# Patient Record
Sex: MALE | Race: BLACK OR AFRICAN AMERICAN
[De-identification: names, ages, dates, MRNs, and addresses within clinical notes are randomized per-mention and may not be internally consistent; named-entity substitution may affect disease eponyms.]

---

## 2020-01-22 ENCOUNTER — HOSPITAL ENCOUNTER (INPATIENT)
Dept: HOSPITAL 92 - ERS | Age: 55
LOS: 2 days | Discharge: HOME | DRG: 812 | End: 2020-01-24
Attending: FAMILY MEDICINE | Admitting: FAMILY MEDICINE
Payer: MEDICARE

## 2020-01-22 VITALS — BODY MASS INDEX: 37.5 KG/M2

## 2020-01-22 DIAGNOSIS — Z86.73: ICD-10-CM

## 2020-01-22 DIAGNOSIS — Z95.5: ICD-10-CM

## 2020-01-22 DIAGNOSIS — D50.9: Primary | ICD-10-CM

## 2020-01-22 DIAGNOSIS — I25.10: ICD-10-CM

## 2020-01-22 DIAGNOSIS — I10: ICD-10-CM

## 2020-01-22 DIAGNOSIS — E78.00: ICD-10-CM

## 2020-01-22 DIAGNOSIS — E78.5: ICD-10-CM

## 2020-01-22 DIAGNOSIS — G40.909: ICD-10-CM

## 2020-01-22 DIAGNOSIS — F32.9: ICD-10-CM

## 2020-01-22 DIAGNOSIS — K21.9: ICD-10-CM

## 2020-01-22 DIAGNOSIS — I25.2: ICD-10-CM

## 2020-01-22 LAB
ALBUMIN SERPL BCG-MCNC: 3.8 G/DL (ref 3.5–5)
ALP SERPL-CCNC: 94 U/L (ref 40–110)
ALT SERPL W P-5'-P-CCNC: (no result) U/L (ref 8–55)
ANION GAP SERPL CALC-SCNC: 12 MMOL/L (ref 10–20)
APTT PPP: 35.6 SEC (ref 22.9–36.1)
AST SERPL-CCNC: 14 U/L (ref 5–34)
BASOPHILS # BLD AUTO: 0.1 THOU/UL (ref 0–0.2)
BASOPHILS NFR BLD AUTO: 0.7 % (ref 0–1)
BILIRUB SERPL-MCNC: 0.3 MG/DL (ref 0.2–1.2)
BUN SERPL-MCNC: 12 MG/DL (ref 8.4–25.7)
CALCIUM SERPL-MCNC: 8.4 MG/DL (ref 7.8–10.44)
CHLORIDE SERPL-SCNC: 106 MMOL/L (ref 98–107)
CO2 SERPL-SCNC: 25 MMOL/L (ref 22–29)
CREAT CL PREDICTED SERPL C-G-VRATE: 0 ML/MIN (ref 70–130)
EOSINOPHIL # BLD AUTO: 0.2 THOU/UL (ref 0–0.7)
EOSINOPHIL NFR BLD AUTO: 2.4 % (ref 0–10)
GLOBULIN SER CALC-MCNC: 3.4 G/DL (ref 2.4–3.5)
GLUCOSE SERPL-MCNC: 94 MG/DL (ref 70–105)
HGB BLD-MCNC: 6.6 G/DL (ref 14–18)
INR PPP: 1.1
IRON SERPL-MCNC: 13 UG/DL (ref 65–175)
LYMPHOCYTES # BLD: 1.9 THOU/UL (ref 1.2–3.4)
LYMPHOCYTES NFR BLD AUTO: 18.8 % (ref 21–51)
MCH RBC QN AUTO: 16.6 PG (ref 27–31)
MCV RBC AUTO: 61.3 FL (ref 78–98)
MDIFF COMPLETE?: YES
MICROCYTES BLD QL SMEAR: (no result) (100X)
MONOCYTES # BLD AUTO: 1.1 THOU/UL (ref 0.11–0.59)
MONOCYTES NFR BLD AUTO: 10.5 % (ref 0–10)
NEUTROPHILS # BLD AUTO: 6.8 THOU/UL (ref 1.4–6.5)
NEUTROPHILS NFR BLD AUTO: 67.6 % (ref 42–75)
OVALOCYTES BLD QL SMEAR: (no result) (100X)
PLATELET # BLD AUTO: 352 THOU/UL (ref 130–400)
POLYCHROMASIA BLD QL SMEAR: (no result) (100X)
POTASSIUM SERPL-SCNC: 3.8 MMOL/L (ref 3.5–5.1)
PROT UR STRIP.AUTO-MCNC: 10 MG/DL
PROTHROMBIN TIME: 13.8 SEC (ref 12–14.7)
RBC # BLD AUTO: 4 MILL/UL (ref 4.7–6.1)
RBC UR QL AUTO: (no result) HPF (ref 0–3)
REFLEX FOR REVIEW??: YES
RETICS/RBC NFR: 2.3 % (ref 0.5–1.5)
SODIUM SERPL-SCNC: 139 MMOL/L (ref 136–145)
TROPONIN I SERPL DL<=0.01 NG/ML-MCNC: (no result) NG/ML (ref ?–0.03)
TROPONIN I SERPL DL<=0.01 NG/ML-MCNC: (no result) NG/ML (ref ?–0.03)
UIBC SERPL-MCNC: 433 MCG/DL (ref 261–462)
WBC # BLD AUTO: 10 THOU/UL (ref 4.8–10.8)
WBC UR QL AUTO: (no result) HPF (ref 0–3)

## 2020-01-22 PROCEDURE — 82274 ASSAY TEST FOR BLOOD FECAL: CPT

## 2020-01-22 PROCEDURE — P9016 RBC LEUKOCYTES REDUCED: HCPCS

## 2020-01-22 PROCEDURE — 83021 HEMOGLOBIN CHROMOTOGRAPHY: CPT

## 2020-01-22 PROCEDURE — 36415 COLL VENOUS BLD VENIPUNCTURE: CPT

## 2020-01-22 PROCEDURE — 82607 VITAMIN B-12: CPT

## 2020-01-22 PROCEDURE — 83036 HEMOGLOBIN GLYCOSYLATED A1C: CPT

## 2020-01-22 PROCEDURE — 85060 BLOOD SMEAR INTERPRETATION: CPT

## 2020-01-22 PROCEDURE — 83540 ASSAY OF IRON: CPT

## 2020-01-22 PROCEDURE — 81003 URINALYSIS AUTO W/O SCOPE: CPT

## 2020-01-22 PROCEDURE — 84484 ASSAY OF TROPONIN QUANT: CPT

## 2020-01-22 PROCEDURE — 83880 ASSAY OF NATRIURETIC PEPTIDE: CPT

## 2020-01-22 PROCEDURE — 30233N1 TRANSFUSION OF NONAUTOLOGOUS RED BLOOD CELLS INTO PERIPHERAL VEIN, PERCUTANEOUS APPROACH: ICD-10-PCS | Performed by: FAMILY MEDICINE

## 2020-01-22 PROCEDURE — 36416 COLLJ CAPILLARY BLOOD SPEC: CPT

## 2020-01-22 PROCEDURE — 83550 IRON BINDING TEST: CPT

## 2020-01-22 PROCEDURE — 82746 ASSAY OF FOLIC ACID SERUM: CPT

## 2020-01-22 PROCEDURE — 86900 BLOOD TYPING SEROLOGIC ABO: CPT

## 2020-01-22 PROCEDURE — 80048 BASIC METABOLIC PNL TOTAL CA: CPT

## 2020-01-22 PROCEDURE — 85046 RETICYTE/HGB CONCENTRATE: CPT

## 2020-01-22 PROCEDURE — 85730 THROMBOPLASTIN TIME PARTIAL: CPT

## 2020-01-22 PROCEDURE — 80053 COMPREHEN METABOLIC PANEL: CPT

## 2020-01-22 PROCEDURE — 83690 ASSAY OF LIPASE: CPT

## 2020-01-22 PROCEDURE — 86850 RBC ANTIBODY SCREEN: CPT

## 2020-01-22 PROCEDURE — 82728 ASSAY OF FERRITIN: CPT

## 2020-01-22 PROCEDURE — 71045 X-RAY EXAM CHEST 1 VIEW: CPT

## 2020-01-22 PROCEDURE — 84443 ASSAY THYROID STIM HORMONE: CPT

## 2020-01-22 PROCEDURE — 80061 LIPID PANEL: CPT

## 2020-01-22 PROCEDURE — 93005 ELECTROCARDIOGRAM TRACING: CPT

## 2020-01-22 PROCEDURE — 86901 BLOOD TYPING SEROLOGIC RH(D): CPT

## 2020-01-22 PROCEDURE — 36430 TRANSFUSION BLD/BLD COMPNT: CPT

## 2020-01-22 PROCEDURE — C9113 INJ PANTOPRAZOLE SODIUM, VIA: HCPCS

## 2020-01-22 PROCEDURE — 85610 PROTHROMBIN TIME: CPT

## 2020-01-22 PROCEDURE — 87389 HIV-1 AG W/HIV-1&-2 AB AG IA: CPT

## 2020-01-22 PROCEDURE — 81015 MICROSCOPIC EXAM OF URINE: CPT

## 2020-01-22 PROCEDURE — 85025 COMPLETE CBC W/AUTO DIFF WBC: CPT

## 2020-01-22 NOTE — CON
DATE OF CONSULTATION:  01/22/2020



REASON FOR CONSULTATION:  Symptomatic anemia.



CONSULTING PROVIDER:  Germania Graham MD



HISTORY OF PRESENT ILLNESS:  The patient is a 55-year-old male with past medical

history of hypertension, hyperlipidemia, cerebrovascular accident with cognitive

dysfunction, seizure disorder, coronary artery disease, and chronic anemia,

initially presenting with complaints of chest pain.  He stated that over the last 2

months, he had been having repeated episodes of substernal chest pain that would

occur primarily with increased exertion or ambulating greater than 300 to 400 yards.

 This was also associated with dyspnea on exertion, but both the dyspnea and chest

pain would resolve with rest.  However, given the increased frequency of this

occurring, he was prompted by his girlfriend to go to the ER for further evaluation.

 On initial evaluation in the ER, he was noted to have a significantly decreased H

and H and was ultimately admitted to the hospital for further evaluation.  Upon

questioning the patient further, he states that he has been taking aspirin 81 mg

daily, but denies any other use of NSAIDs.  Currently denies any nausea, vomiting,

fevers, chills, hematemesis, melena, hematochezia, dysphagia, odynophagia, or weight

loss. 



Of note, the patient was admitted to the hospital in June 2018 with complaints of

loss of consciousness at the [facility].  On evaluation of the

patient in the ER at that time, he was noted to have a significantly decreased H and

H.  He subsequently underwent both upper and lower endoscopy during that admission

with normal findings during the upper endoscopy and only findings of a single

descending colon polyp that was later read as a tubular adenoma. 



REVIEW OF SYSTEMS:  A 10-category review of systems was obtained with all responses

negative except for the pertinent positives as listed in HPI. 



PAST MEDICAL HISTORY:  As per HPI.



PAST SURGICAL HISTORY:  Cardiac catheterization.



FAMILY HISTORY:  Denies any GI malignancies.



SOCIAL HISTORY:  Denies any tobacco, alcohol, or illicit drug use.



OUTPATIENT MEDICATIONS:  Reviewed.



ALLERGIES:  NO KNOWN DRUG ALLERGIES.



PHYSICAL EXAMINATION:

VITAL SIGNS:  Temperature 98.2, pulse 72, blood pressure 125/65, respiratory rate

16, saturating 96% on room air. 

GENERAL: The patient was sitting at bedside, in no acute distress.  Alert and

oriented x4. 

HEENT:  Normocephalic, atraumatic. 

NECK:  Supple.  No JVD or scleral icterus noted. 

CARDIOVASCULAR:  Regular rate and rhythm with no discernible murmurs, gallops, or

rubs, albeit somewhat muffled heart sounds. 

RESPIRATORY:  Clear to auscultation bilaterally with no discernible wheezes or

rales. 

ABDOMEN:  Normoactive bowel sounds.  Soft.  Mild abdominal distention.  No pain.  No

tenderness to palpation. 

EXTREMITIES:  1+/2+ bilateral lower extremity edema extending up to the lower

portion of the knees.  No cyanosis or clubbing. 



LABORATORY DATA:  CBC with a white blood cell count of 10, hemoglobin 6.6,

hematocrit 24.5, platelets 352.  Chemistry with a sodium of 139, potassium 3.8,

chloride 106, CO2 of 25, BUN 12, creatinine 0.8, glucose 94, AST 14, ALT less than

7, alkaline phosphatase 94, total bilirubin 0.3.  Iron 13, ferritin less than 2,

TIBC 433, INR 1.1, and albumin 3.8. 



IMAGING DATA:  Chest x-ray was obtained on January 22, 2020, which showed no acute

intrathoracic abnormality. 



ASSESSMENT AND PLAN:  The patient is a 55-year-old  male with past

medical history of hypertension, hyperlipidemia, CVA with cognitive dysfunction,

seizure disorder, coronary artery disease, and chronic anemia, presenting with

symptomatic anemia. 



Symptomatic anemia/anemia of unknown origin. 

The patient is presenting with a 2-month history of increased dyspnea on exertion

and chest pain with exertion that would essentially resolve with rest.  He did not

exhibit any worsening of either the symptoms during this time period, which was

initially concerning for stable angina.  However, the patient has been evaluated for

cardiac workup during this hospitalization and while he was undergoing this workup

was noted to have a significantly decreased H and H.  On chart review, the patient

was admitted to the [facility] in June 2018 for similar reasons,

where he was noted to have significant iron deficiency anemia with a decreased H and

H with an MCV of 72 at that time.  He subsequently underwent both EGD and

colonoscopy with no etiology for his bleeding seen on either of those studies.  The

patient was discharged with instructions to take iron supplementation.  However, he

discontinued most of his medications around 2 to 3 months ago.  At this time, the

patient is again presenting with recurrence of his iron deficiency anemia and

symptomatic anemia, raising the question of a possible GI bleed.  Given his negative

endoscopies in the past, a GI bleeding source is less likely, although cannot be

ruled out at this time.  Differential could include esophagitis, gastritis, peptic

ulcer disease, arteriovenous malformation, Dieulafoy lesion, colitis and/or GI

malignancy (less likely). 



RECOMMENDATIONS:  

1. We would continue to trend his H and H and transfuse as necessary to maintain an

H and H of 7/21. 

2. Continue to monitor clinically for signs of active GI bleeding.

3. We would place the patient on PPI 40 mg b.i.d. in light of possible upper GI

bleed. 

4. We would place the patient on a clear liquid diet today with n.p.o. at midnight

in anticipation of endoscopy tomorrow. 

5. We will plan for both EGD and colonoscopy tomorrow for intraluminal evaluation of

possible bleeding source.  If no etiology of his bleeding source is seen during

these examinations, then I would recommend outpatient followup and a capsule

endoscopy at that time. 

6. We would pursue non GI sources of anemia given negative studies in the past.

7. Agree with transfusion and iron supplementation. 



We will continue to follow.  Please call with any questions.







Job ID:  [id number]

## 2020-01-22 NOTE — PDOC.FPRHP
Addendum entered and electronically signed by Germania Graham MD  01/22/20 16:00

: 





A/P:


-IV protonix BID, still consider GI bleed with severe Hb 


-Will consult gastroenterology for further assessment 





Original Note:








- History of Present Illness


Chief Complaint: chest pain


History of Present Illness: 





56 yo AAM with CAD s/p stent x1, HLD, HTN, hx of CVA presents to ER for a few 

weeks of chest pain. Midsternal, sharp, non radiating. No nausea or diaphoresis 

but associated with dypsnea on exertion. Better with rest, worse with exertion. 

Has not taken anything for it since ran out of meds a few days ago. He was 

given isosorbide mononitrate with resolution of pain. EKG was WNL with no signs 

of ischemia. Incidentally on labs patient had Hb of 6.6. Denies hematemesis, 

melena, hematochezia or any other signs of bleeding. States he had a 

colonoscopy last year which was reportedly "normal." No family history of colon 

cancer, coagulopathies. Patient hasn't seen a PCP for two years.





- Allergies/Adverse Reactions


 Allergies











Allergy/AdvReac Type Severity Reaction Status Date / Time


 


No Known Allergies Allergy   Verified 01/22/20 15:44














- Home Medications


 











 Medication  Instructions  Recorded  Confirmed  Type


 


Aspirin [Ecotrin Low Strength] 81 mg PO DAILY #0 tab 12/11/15 01/22/20 Rx


 


Carvedilol [Coreg] 25 mg PO BID #0 tab 12/11/15 01/22/20 Rx


 


levETIRAcetam [Keppra] 500 mg PO BID 01/09/17 01/22/20 History


 


Isosorbide Mononitrate [Isosorbide 60 mg PO DAILY 01/22/20 01/22/20 History





Mononitrate ER]    














- History


PMHx:Seizure disorder, Basilar artery infarct in 2015, CAD with MI s/p stent, 

HTN, DLD





PSHx: Cardiac cath in 2014





FHx:Mother and father with MIs


 


Social:denies TAD


 








- Review of Systems


General: denies: fever/chills, weight/appetite/sleep changes


Respiratory: reports: shortness of breath, exercise intolerance.  denies: cough

, congestion


Cardiovascular: reports: chest pain.  denies: palpitation, edema


Gastrointestinal: denies: nausea, vomiting, diarrhea, constipation, abdominal 

pain, GI bleeding


Genitourinary: denies: incontinence, dysuria


Skin: denies: rashes, lesions


Musculoskeletal: denies: pain, tenderness


Neurological: reports: weakness


Psychological: denies: anxiety, depression





- Vital signs


BP: 106/80, MAP: 88, Pulse: 69, Resp: 15, Pain: 3, O2 sat: 99 on (Room Air), 

Time: 1/22/2020 12:55.





- Physical Exam


Constitutional: NAD, awake, alert and oriented


HEENT: normocephalic and atraumatic, PERRLA, EOMI, conjunctiva clear, MMM, 

oropharynx clear


Neck: supple, no LAD


Heart: RRR, normal S1/S2, no murmurs/rubs/gallops


Lungs: CTAB, no respiratory distress, good air movement


Abdomen: soft, non-tender


Musculoskeletal: normal structure, normal tone


-Neurological: 





right upper and lower extremity 4/5


left 5/5


Heme/Lymphatic: no unusual bruising or bleeding, no purpura





FMR H&P: Results





- Labs


Result Diagrams: 


 01/22/20 11:50





 01/22/20 11:50


Lab results: 


 











WBC  10.0 thou/uL (4.8-10.8)   01/22/20  11:50    


 


Hgb  6.6 g/dL (14.0-18.0)  L  01/22/20  11:50    


 


Hct  24.5 % (42.0-52.0)  L  01/22/20  11:50    


 


MCV  61.3 fL (78.0-98.0)  L  01/22/20  11:50    


 


Plt Count  352 thou/uL (130-400)   01/22/20  11:50    


 


Neutrophils %  67.6 % (42.0-75.0)   01/22/20  11:50    


 


Sodium  139 mmol/L (136-145)   01/22/20  11:50    


 


Potassium  3.8 mmol/L (3.5-5.1)   01/22/20  11:50    


 


Chloride  106 mmol/L ()   01/22/20  11:50    


 


Carbon Dioxide  25 mmol/L (22-29)   01/22/20  11:50    


 


BUN  12 mg/dL (8.4-25.7)   01/22/20  11:50    


 


Creatinine  0.80 mg/dL (0.7-1.3)   01/22/20  11:50    


 


Glucose  94 mg/dL ()   01/22/20  11:50    


 


Calcium  8.4 mg/dL (7.8-10.44)   01/22/20  11:50    


 


Total Bilirubin  0.3 mg/dL (0.2-1.2)   01/22/20  11:50    


 


AST  14 U/L (5-34)   01/22/20  11:50    


 


ALT  Less than  7 U/L (8-55)  L  01/22/20  11:50    


 


Alkaline Phosphatase  94 U/L ()   01/22/20  11:50    


 


Serum Total Protein  7.2 g/dL (6.0-8.3)   01/22/20  11:50    


 


Albumin  3.8 g/dL (3.5-5.0)   01/22/20  11:50    


 


Lipase  25 U/L (8-78)   01/22/20  11:50    


 


Urine Ketones  Negative mg/dL (Negative)   01/22/20  13:10    


 


Urine Blood  Trace  (Negative)  A  01/22/20  13:10    


 


Urine Nitrite  Negative  (Negative)   01/22/20  13:10    


 


Ur Leukocyte Esterase  Negative Hernan/uL (Negative)   01/22/20  13:10    


 


Urine RBC  0-3 HPF (0-3)   01/22/20  13:10    


 


Urine WBC  0-3 HPF (0-3)   01/22/20  13:10    


 


Ur Squamous Epith Cells  0-3 HPF (0-3)   01/22/20  13:10    


 


Urine Bacteria  None Seen HPF (None Seen)   01/22/20  13:10    














- Radiology Interpretation


  ** Chest x-ray


Status: image reviewed by me, report reviewed by me


Additional comment: 





cardiac silhouette seems mildly enlarged from my read however official 

radiology read it is within normal limits


negative for acute cardiopulmonary processes otherwise





FMR H&P: A/P





- Problem List


(1) Symptomatic anemia


Current Visit: Yes   Status: Acute   Code(s): D64.9 - ANEMIA, UNSPECIFIED   





(2) Chest pain


Current Visit: No   Status: Acute   Code(s): R07.9 - CHEST PAIN, UNSPECIFIED   





(3) HTN (hypertension)


Current Visit: No   Status: Chronic   Code(s): I10 - ESSENTIAL (PRIMARY) 

HYPERTENSION   


Qualifiers: 


   Hypertension type: essential hypertension   Qualified Code(s): I10 - 

Essential (primary) hypertension   





(4) History of CVA (cerebrovascular accident)


Current Visit: No   Status: Chronic   Code(s): Z86.73 - PRSNL HX OF TIA (TIA), 

AND CEREB INFRC W/O RESID DEFICITS   





- Plan





56 yo non compliant patient with hx of CVA, CAD s/p 1 stent, HLD, HTN admitted 

for symptomatic anemia and chest pain, ACS rule out.








1. Typical chest pain 2/2 symptomatic anemia vs. ACS


-Hb 6.6, FOBT neg, rectal exam w/ no gross blood per ERMD. 


-Transfuse 1 PRBC with repeat H/H, will order further labs to work up anemia 

with iron studies, PBS


-June 2018 had EGD & colonoscopy @ [facility] with essentially normal 

results- colonoscopy showed polyp. Will have records faxed to review. In 

meantime monitor for clinical signs of bleeding, vitals stable.


-In regards to ACS- several risk factors, non compliant with medications.  

Troponin neg x1, EKG WNL, however HEART score=5, will complete ACS workup. 

Admit to tele. Trend trops. Hold antiplatelets due to anemia and concern for 

bleeding. Risk stratifying labs.





2. HTN


-Resume home coreg when no longer NPO


-IV antihypertensives PRN





3. DLD


-Recheck FLP


-Restart statin





4. Seizure disorder


-Check keppra level


-Resume





5. Hx of CAD s/p stent


-Start on medication regimen 





6. Hx of basilar artery thrombosis


-Patient previously on plavix


-Hold for now in light of anemia





7. Possible hx of HF?


-Reported this to ERMD


-Patient last echo 2017 and NMST showing borderline EF of 55%


-Clinically euvolemic


-Unsure if on lasix? Discharged in 2017 on it but patient has been off of it 

for quite some time


-Monitor volume status, will repeat echo





8. GERD


-famotidine








dvt ppx: SCDs


gi ppx: famotidine


dispo: >2 midnights














FMR H&P: Upper Level





- Plan


Date/Time: 01/22/20 1421








I, [], have evaluated this patient and agree with findings/plan as outlined by 

intern resident. Pertinent changes/additions are listed here.








Addendum - Attending





- Attending Attestation


Date/Time: 01/22/20 3503





I personally evaluated the patient and discussed the management with Dr. Graham


I agree with the History, Examination, Assessment and Plan documented above 

with any addition or exceptions noted below -56 yo AAM with history of CAD s/p 

stent x1, HLD, HTN, seizure disorder, and h/o CVA presents with chest pain for 

last few weeks. Midsternal, sharp, non radiating. No nausea or diaphoresis but 

associated with dypsnea on exertion. Better with rest, worse with exertion. Has 

not taken anything for it since ran out of meds a few days ago. PMH/PSH/Meds 

reviewed and agree with resident's documentation. Afebrile VSS Exam repeated by 

me and agree with resident's findings. Labs: WBC=10, H/H=6.6/24.5, Eyi=682, Na=

139, K=3.8, Ii=714, CO2=25, BUN/Cr=12/0.80, Gluc=94, Fe=13. A/P: 1) Chest pain 

- initial troponin negative nad EKG with no acute changes. Will trend enzymes. 

Most likely secondary to demand due to severe anemia. 2) Severe microcytic 

anemia - transfusing 1 unit currently; recheck H/H later tonight. Go contacted 

and plan for C-scope tomorrow. 3) Seizure d/o - continue keppra, 4) CAD- will 

plan for stress test

## 2020-01-22 NOTE — RAD
CHEST 1 VIEW:

 

Date:  01/22/2020

 

HISTORY:  

Chest pain. 

 

COMPARISON:  

Radiograph dated 07/21/17. 

 

FINDINGS:

Lungs are clear. No pneumothorax or effusion. Cardiac silhouette and mediastinal contours within norm
al limits. 

 

IMPRESSION: 

No acute intrathoracic abnormality. 

 

 

POS: TPC

## 2020-01-23 LAB
ANION GAP SERPL CALC-SCNC: 14 MMOL/L (ref 10–20)
BUN SERPL-MCNC: 8 MG/DL (ref 8.4–25.7)
CALCIUM SERPL-MCNC: 8.3 MG/DL (ref 7.8–10.44)
CHD RISK SERPL-RTO: 5.1 (ref ?–4.5)
CHLORIDE SERPL-SCNC: 107 MMOL/L (ref 98–107)
CHOLEST SERPL-MCNC: 148 MG/DL
CO2 SERPL-SCNC: 25 MMOL/L (ref 22–29)
CREAT CL PREDICTED SERPL C-G-VRATE: 150 ML/MIN (ref 70–130)
GLUCOSE SERPL-MCNC: 82 MG/DL (ref 70–105)
HDLC SERPL-MCNC: 29 MG/DL
HGB BLD-MCNC: 7.7 G/DL (ref 14–18)
HGB BLD-MCNC: 8 G/DL (ref 14–18)
HIV 1/2 INDEX: 0.14 S/CO (ref ?–1)
LDLC SERPL CALC-MCNC: 103 MG/DL
MCH RBC QN AUTO: 18.4 PG (ref 27–31)
MCV RBC AUTO: 65.2 FL (ref 78–98)
PLATELET # BLD AUTO: 355 THOU/UL (ref 130–400)
POTASSIUM SERPL-SCNC: 3.5 MMOL/L (ref 3.5–5.1)
RBC # BLD AUTO: 4.33 MILL/UL (ref 4.7–6.1)
SODIUM SERPL-SCNC: 142 MMOL/L (ref 136–145)
TRIGL SERPL-MCNC: 80 MG/DL (ref ?–150)
WBC # BLD AUTO: 13.3 THOU/UL (ref 4.8–10.8)

## 2020-01-23 NOTE — PDOC.FM
- Subjective


Subjective: 


Doing well this morning, no acute events overnight. S/p 2u pRBC, tolerated 

well. Denies any CP, SOB, n/v, bloody stools, dark tarry stools. No dizziness/

lightheadness.








- Objective


MAR Reviewed: Yes


Vital Signs & Weight: 


 Vital Signs (12 hours)











  Temp Pulse Pulse Resp BP BP BP


 


 01/23/20 08:02  98.4 F  76   16   120/56 L 


 


 01/23/20 04:15  98 F  81   18   146/75 H 


 


 01/23/20 02:35  97.4 F L   85  16  153/77 H  


 


 01/23/20 02:20  98.1 F  80  80  16  142/87 H  142/87 H 


 


 01/22/20 20:45  98.4 F  83   16    137/81














  Pulse Ox


 


 01/23/20 08:02  94 L


 


 01/23/20 04:15  98


 


 01/23/20 02:35  100


 


 01/23/20 02:20  95


 


 01/22/20 20:45  99








 Weight











Weight                         99.11 kg














I&O: 


 











 01/22/20 01/23/20 01/24/20





 06:59 06:59 06:59


 


Intake Total  1530 


 


Output Total  800 


 


Balance  730 











Result Diagrams: 


 01/23/20 05:52





 01/23/20 05:52


EKG Reviewed by me: Yes (Tele: NSR)





Phys Exam





- Physical Examination


Constitutional: NAD (resting comfortably, A/O x2, not to time, but appears at 

baseline)


HEENT: moist MMs


pale mucous membranes


Neck: supple


Respiratory: no wheezing, no rales, no rhonchi, clear to auscultation bilateral


Cardiovascular: RRR, no significant murmur, no rub


Gastrointestinal: soft, non-tender, no distention, positive bowel sounds


Musculoskeletal: no edema


Neurological: non-focal, moves all 4 limbs


Psychiatric: normal affect





Dx/Plan


(1) Symptomatic anemia


Code(s): D64.9 - ANEMIA, UNSPECIFIED   Status: Acute   





(2) Chest pain


Code(s): R07.9 - CHEST PAIN, UNSPECIFIED   Status: Resolved   





(3) HTN (hypertension)


Code(s): I10 - ESSENTIAL (PRIMARY) HYPERTENSION   Status: Chronic   


Qualifiers: 


   Hypertension type: essential hypertension   Qualified Code(s): I10 - 

Essential (primary) hypertension   





(4) History of CVA (cerebrovascular accident)


Code(s): Z86.73 - PRSNL HX OF TIA (TIA), AND CEREB INFRC W/O RESID DEFICITS   

Status: Chronic   





- Plan


Plan: 


54 yo AAM non-compliant patient with hx of CVA, CAD s/p 1 stent, HLD, HTN 

admitted for symptomatic anemia and chest pain, ACS rule out.





#Typical chest pain 2/2 symptomatic anemia vs. ACS


- Hb 6.6 at admission, FOBT neg, rectal exam w/ no gross blood per ER MD. 


- s/p 2u pRBC with improvement of Hb to 8 and resolution of sxs this AM


- Ferritin <2.


- Consulted GI, had EGD and colonoscopy in June 2018 for similar presentation 

that was negative except for 1 polyp, GI plan to perform repeat upper and lower 

scope today to eval for acute GI blood loss. 


- VSS this AM, no events on tele. Trops neg x3


- Holding antiplts


- Will check HIV





#HTN


-Resume home coreg when no longer NPO


-IV antihypertensives PRN





#HLD


- FLP WNL. Will cont home statin 2/2 h/o CAD.





#Seizure disorder


-Cont home Keppra.





#Hx of CAD s/p stent


- cont medication regimen 





#Hx of basilar artery thrombosis


- Patient previously on plavix. Hold for now in light of anemia





#Possible hx of HF?


-Reported this to ERMD. Patient last echo 2017 and NMST showing borderline EF 

of 55%


-Clinically euvolemic


-Unsure if on lasix? Discharged in 2017 on it but patient has been off of it 

for quite some time


-Monitor volume status, will repeat echo





#GERD


-famotidine





dvt ppx: SCDs


gi ppx: famotidine


dispo: >2 midnights





Dispo: Admitted for sxs anemia. GI consulted, plan for upper and lower GI today

, pending results and recs.








Addendum - Attending





- Attending Attestation


Date/Time: 01/23/20 7879





I personally evaluated the patient and discussed the management with Dr. Frank. 


I agree with the History, Examination, Assessment and Plan documented above 

with any addition or exceptions noted below.





Patient here for symptomatic anemia, unknown cause. However, his anemia is non-

regenerative and he has profound iron deficiency. He is undergoing GI 

evaluation today to evaluate for bleeding source, but he still has component of 

decreased production. Will give iron infusion and may need further workup on 

the reason for his hyporegenerative anemia. Symptoms improved after 

transfusion. Further mgmt per GI recs.

## 2020-01-23 NOTE — PRG
DATE OF SERVICE:  01/23/2020



REASON FOR CONSULTATION:  Symptomatic anemia.



SUBJECTIVE:  The patient states that he is feeling better today after the infusion

of blood yesterday and has not had any episodes of hematemesis, melena, or

hematochezia.  He was scheduled for both EGD and colonoscopy earlier this morning,

but inadvertently ate potato chips prior to being called for these procedures,

thereby cancelling his n.p.o. status and ability to undergo deep sedation with

anesthesia support.  Otherwise, the patient denies any nausea, vomiting, fevers,

chills, dysphagia, or odynophagia. 



OBJECTIVE:  VITAL SIGNS:  Temperature 98.2, pulse 80, blood pressure 144/80,

respiratory rate 18, and saturating 95% on room air. 

GENERAL:  The patient was lying in bed, in no acute distress.  Alert and oriented

x4. 

CARDIOVASCULAR:  Regular rate and rhythm. 

RESPIRATORY:  Clear to auscultation bilaterally. 

ABDOMEN:  Normoactive bowel sounds.  Soft, nontender, and nondistended. 

EXTREMITIES:  1+/2+ bilateral lower extremity edema extending up to the lower

portion of the knees.  No cyanosis or clubbing. 



LABORATORY DATA:  CBC with a white blood cell count of 13.3, hemoglobin 8.0,

hematocrit 28.2, platelets 355.  Chemistry with a sodium of 142, potassium 3.5,

chloride 107, CO2 of 25, BUN 8, creatinine 0.79, glucose 85. 



IMAGING DATA:  No current GI imaging is available for review.



ASSESSMENT AND PLAN:  The patient is a 55-year-old  male with past

medical history of hypertension, hyperlipidemia, cerebrovascular accident with

cognitive dysfunction, seizure disorder, coronary artery disease, and chronic anemia

presenting with symptomatic anemia of unknown origin. 



Symptomatic anemia/anemia of unknown origin:  The patient has initially presented

with a two-month history of increased dyspnea on exertion and chest pain that would

essentially resolve with rest.  Upon evaluation in the ER, he had a significantly

decreased H and H when compared to previous.  On chart review, the patient was

admitted to [facility] in June 2018 for similar reasons and

noted to have iron deficiency anemia with decreased H and H at that time.  He

subsequently underwent both EGD and colonoscopy with no etiology for his blood loss

based on those studies.  Currently, doing well with no complaints of obvious

gastrointestinal bleeding given his recurrence of iron deficiency anemia and

symptomatic component of this anemia, repeat upper and lower endoscopy is indicated.

 However, if a gastrointestinal bleeding source cannot be elicited during this

hospitalization, a non-gastrointestinal bleeding source should be considered.

Differential could include esophagitis, gastritis, peptic ulcer disease,

arteriovenous malformation, Dieulafoy lesion, colitis and/or gastrointestinal

malignancy. 



RECOMMENDATIONS:  

1. Would continue to trend his H and H and transfuse as necessary to maintain an H

and H of 7/21. 

2. Continue to monitor clinically for signs of active GI bleeding.

3. Continue the patient on PPI 40 mg b.i.d. in light of possible upper GI bleed.

4. Would restart the patient on clear liquid diet today with repeat GoLYTELY prep in

preparation for EGD and colonoscopy tomorrow. 

5. If no etiology of the bleeding source is seen during the EGD and colonoscopy,

then I would recommend an outpatient followup in a capsule endoscopy at that time. 

6. Agree with pursuing a non-GI source of anemia given his negative GI studies in

the past. 

We will continue to follow.  Please call with any questions.







Job ID:  [id number]

## 2020-01-24 VITALS — SYSTOLIC BLOOD PRESSURE: 147 MMHG | DIASTOLIC BLOOD PRESSURE: 75 MMHG

## 2020-01-24 VITALS — TEMPERATURE: 98 F

## 2020-01-24 LAB
BASOPHILS # BLD AUTO: 0.1 THOU/UL (ref 0–0.2)
BASOPHILS NFR BLD AUTO: 0.6 % (ref 0–1)
EOSINOPHIL # BLD AUTO: 0.4 THOU/UL (ref 0–0.7)
EOSINOPHIL NFR BLD AUTO: 2.6 % (ref 0–10)
HGB A2 MFR BLD COLUMN CHROM: 1.5 % (ref 1.8–3.2)
HGB BLD-MCNC: 8.1 G/DL (ref 14–18)
HGB C MFR BLD: 0 %
HGB F MFR BLD: 0 % (ref 0–2)
HGB S MFR BLD: 0 %
LYMPHOCYTES # BLD: 2.4 THOU/UL (ref 1.2–3.4)
LYMPHOCYTES NFR BLD AUTO: 17.7 % (ref 21–51)
MCH RBC QN AUTO: 18.8 PG (ref 27–31)
MCV RBC AUTO: 65.2 FL (ref 78–98)
MDIFF COMPLETE?: YES
MICROCYTES BLD QL SMEAR: (no result) (100X)
MONOCYTES # BLD AUTO: 1.3 THOU/UL (ref 0.11–0.59)
MONOCYTES NFR BLD AUTO: 9.6 % (ref 0–10)
NEUTROPHILS # BLD AUTO: 9.4 THOU/UL (ref 1.4–6.5)
NEUTROPHILS NFR BLD AUTO: 69.5 % (ref 42–75)
OVALOCYTES BLD QL SMEAR: (no result) (100X)
PLATELET # BLD AUTO: 368 THOU/UL (ref 130–400)
POLYCHROMASIA BLD QL SMEAR: (no result) (100X)
RBC # BLD AUTO: 4.34 MILL/UL (ref 4.7–6.1)
WBC # BLD AUTO: 13.5 THOU/UL (ref 4.8–10.8)

## 2020-01-24 PROCEDURE — 0DB78ZX EXCISION OF STOMACH, PYLORUS, VIA NATURAL OR ARTIFICIAL OPENING ENDOSCOPIC, DIAGNOSTIC: ICD-10-PCS | Performed by: INTERNAL MEDICINE

## 2020-01-24 PROCEDURE — 0DBK8ZZ EXCISION OF ASCENDING COLON, VIA NATURAL OR ARTIFICIAL OPENING ENDOSCOPIC: ICD-10-PCS | Performed by: INTERNAL MEDICINE

## 2020-01-24 PROCEDURE — 0DBN8ZZ EXCISION OF SIGMOID COLON, VIA NATURAL OR ARTIFICIAL OPENING ENDOSCOPIC: ICD-10-PCS | Performed by: INTERNAL MEDICINE

## 2020-01-24 NOTE — OP
DATE OF PROCEDURE:  01/24/2020



PROCEDURES PERFORMED:  Esophagogastroduodenoscopy with biopsy and colonoscopy with

snare polypectomy. 



PREOPERATIVE DIAGNOSIS:  Iron-deficiency anemia.



DESCRIPTION OF PROCEDURE:  Informed consent was obtained from the patient.  He was

sedated with total intravenous anesthesia.  The bite block was placed, and the

endoscope was advanced easily to the second portion of the duodenum and retroflexion

was performed in the stomach.  The esophagus was normal.  The GE junction was

normal.  The stomach had 4 white-based ulcers in the antrum measuring around 8 mm

were cratered.  There was no stigmata of recent bleeding.  Biopsies were obtained

from the ulcer edges in the antrum and stomach to rule out H pylori.  Retroflexed

views in the stomach were normal.  The pylorus and first and second portions of the

duodenum were normal.  The patient was turned around.  Rectal exam was performed and

was normal.  The colonoscope was advanced to the terminal ileum without difficulty.

The mucosa of the terminal ileum was normal.  The ileocecal valve and appendiceal

orifice were clearly identified.  The preparation quality was good.  An 8 mm

pedunculated polyp was removed from the distal ascending colon by snare cautery

polypectomy.  A 5 mm flat, pale polyp was removed from the distal sigmoid colon by

cold snare polypectomy.  There were moderate internal hemorrhoids noted on

retroflexed views in the rectum.  The remainder of the colonic mucosa was normal. 



IMPRESSION:  

1. Four ulcers in the gastric antrum, which were cratered and measured around 8 mm

with clean white based without stigmata of recent bleeding.  Biopsies were obtained

to rule out Helicobacter pylori and also from the ulcer edges. 

2. 8 mm pedunculated polyp removed from the ascending colon.

3. 5 mm pale polyp removed from the sigmoid colon.

4. Otherwise, normal esophagogastroduodenoscopy and colonoscopy.



RECOMMENDATIONS:  

1. Iron supplementation.

2. Proton pump inhibitor.

3. Follow up in GI Clinic with Dr. Ng to verify that his hemoglobin is improving

with the iron and proton pump inhibitor. 

4. I will sign off for now.  Please call if GI can be of assistance.







Job ID:  [id number]

## 2020-01-24 NOTE — PDOC.FM
- Subjective


Subjective: 


Doing well this morning, no acute events overnight. Was unable to have 

endoscopy yesterday as pt ate food from guest in room. Continued bowel prep 

with plan for scopes this AM. No return of any CP. No blood per rectum. No 

dizziness/lightheadedness/n/v/sob. Clear stools with prep.








- Objective


MAR Reviewed: Yes


Vital Signs & Weight: 


 Vital Signs (12 hours)











  Temp Pulse Resp BP Pulse Ox


 


 01/24/20 07:25  98.1 F  81  18  131/70  97


 


 01/24/20 04:28  98 F  85  18  142/67 H  96


 


 01/23/20 20:43  98.2 F  80  18  144/80 H  95








 Weight











Weight                         99.11 kg














I&O: 


 











 01/23/20 01/24/20 01/25/20





 06:59 06:59 06:59


 


Intake Total 1530 640 


 


Output Total 800 1050 


 


Balance 730 -410 











Result Diagrams: 


 01/24/20 06:59





 01/23/20 05:52





Phys Exam





- Physical Examination


Constitutional: NAD (resting comfortably)


HEENT: moist MMs


Neck: supple


Respiratory: no wheezing, no rales, no rhonchi, clear to auscultation bilateral


Cardiovascular: RRR, no significant murmur, no rub


Gastrointestinal: soft, non-tender, no distention, positive bowel sounds


Musculoskeletal: no edema, pulses present


Neurological: non-focal, moves all 4 limbs


Psychiatric: normal affect


Deviation from normal: A/O x2 but appears at baseline mentation





Dx/Plan


(1) Symptomatic anemia


Code(s): D64.9 - ANEMIA, UNSPECIFIED   Status: Acute   





(2) Chest pain


Code(s): R07.9 - CHEST PAIN, UNSPECIFIED   Status: Resolved   





(3) HTN (hypertension)


Code(s): I10 - ESSENTIAL (PRIMARY) HYPERTENSION   Status: Chronic   


Qualifiers: 


   Hypertension type: essential hypertension   Qualified Code(s): I10 - 

Essential (primary) hypertension   





(4) History of CVA (cerebrovascular accident)


Code(s): Z86.73 - PRSNL HX OF TIA (TIA), AND CEREB INFRC W/O RESID DEFICITS   

Status: Chronic   





- Plan


Plan: 


56 yo AAM non-compliant patient with hx of CVA, CAD s/p 1 stent, HLD, HTN 

admitted for symptomatic anemia and chest pain, ACS rule out.





#Typical chest pain suspected 2/2 symptomatic anemia, pain resolved


- Hb 6.6 at admission, FOBT neg, rectal exam w/ no gross blood per ER MD. 


- s/p 2u pRBC with improvement of Hb to 8 and resolution of sxs. Hb stable at 8 

today.


- Ferritin <2. s/p IV iron


- Consulted GI, had EGD and colonoscopy in June 2018 for similar presentation 

that was negative except for 1 polyp, GI plan to perform repeat upper and lower 

scope today to eval for acute GI blood loss. Unable to obtain scopes yesterday 2

/2 PO intake. Apprec assistance and GI recs.


- VSS this AM, no events on tele. Trops neg x3


- Holding antiplts


- HIV negative


- Retic 2.3, absolute retic 1.3 demonstrating hypoproliferation, consider 2/2 

to diet and low iron vs bone marrow suppression. Hb electrophoresis pending. 

Other cell lines WNL. Will need continued OP work up for chronic anemia if 

scopes negative for acute GI bleed. 





#HTN


-Resume home coreg when no longer NPO


-IV antihypertensives PRN





#HLD


- FLP WNL. Will cont home statin 2/2 h/o CAD.





#Seizure disorder


- Cont home Keppra.





#Hx of CAD s/p stent


- cont medication regimen 





#Hx of basilar artery thrombosis


- Patient previously on plavix. Not on current home meds. 





#Possible hx of HF?


-Reported this to ERMD. Patient last echo 2017 and NMST showing borderline EF 

of 55%


-Clinically euvolemic


-Unsure if on lasix? Discharged in 2017 on it but patient has been off of it 

for quite some time


-Monitor volume status, will need OP f/u.





#GERD


-famotidine





Code: Full





dvt ppx: SCDs


Diet: NPO for endoscopy


IVF: SL





PCP: None





Dispo: Admitted for sxs anemia. GI consulted, plan for upper and lower GI today

, pending results and recs.








Addendum - Attending





- Attending Attestation


Date/Time: 01/24/20 [id number]





I personally evaluated the patient and discussed the management with Dr. Frank. 


I agree with the History, Examination, Assessment and Plan documented above 

with any addition or exceptions noted below.





Patient here for symptomatic anemia in the setting of a microcytic, non-

regenerative anemia. His iron counts are extremely low and he is s/p iron 

infusion. He is undergoing GI evaluation today to evaluate for source of blood 

loss. However, this may all be related to bone marrow failure from his severe 

iron deficiency or some other cause. We have begun that evaluation, but will 

await further testing until the completion of GI workup. H/H stable today. 

Further mgmt per GI recs.

## 2021-08-23 ENCOUNTER — HOSPITAL ENCOUNTER (INPATIENT)
Dept: HOSPITAL 92 - CSHERS | Age: 56
LOS: 3 days | Discharge: HOME HEALTH | DRG: 281 | End: 2021-08-26
Attending: FAMILY MEDICINE | Admitting: INTERNAL MEDICINE
Payer: MEDICARE

## 2021-08-23 VITALS — BODY MASS INDEX: 35.4 KG/M2

## 2021-08-23 DIAGNOSIS — Z20.822: ICD-10-CM

## 2021-08-23 DIAGNOSIS — E78.5: ICD-10-CM

## 2021-08-23 DIAGNOSIS — I21.A1: ICD-10-CM

## 2021-08-23 DIAGNOSIS — I48.19: ICD-10-CM

## 2021-08-23 DIAGNOSIS — I25.10: ICD-10-CM

## 2021-08-23 DIAGNOSIS — Z91.14: ICD-10-CM

## 2021-08-23 DIAGNOSIS — Z87.891: ICD-10-CM

## 2021-08-23 DIAGNOSIS — I11.0: Primary | ICD-10-CM

## 2021-08-23 DIAGNOSIS — Z86.73: ICD-10-CM

## 2021-08-23 DIAGNOSIS — I50.23: ICD-10-CM

## 2021-08-23 DIAGNOSIS — E66.9: ICD-10-CM

## 2021-08-23 DIAGNOSIS — J44.9: ICD-10-CM

## 2021-08-23 LAB
ALBUMIN SERPL BCG-MCNC: 3.6 G/DL (ref 3.5–5)
ALP SERPL-CCNC: 89 U/L (ref 40–110)
ALT SERPL W P-5'-P-CCNC: 35 U/L (ref 8–55)
ANION GAP SERPL CALC-SCNC: 15 MMOL/L (ref 10–20)
APTT PPP: 27.7 SEC (ref 22–33)
AST SERPL-CCNC: 30 U/L (ref 5–34)
BASOPHILS # BLD AUTO: 0 10X3/UL (ref 0–0.2)
BASOPHILS NFR BLD AUTO: 0.3 % (ref 0–2)
BILIRUB SERPL-MCNC: 0.6 MG/DL (ref 0.2–1.2)
BUN SERPL-MCNC: 15 MG/DL (ref 8.4–25.7)
CALCIUM SERPL-MCNC: 9.4 MG/DL (ref 7.8–10.44)
CHLORIDE SERPL-SCNC: 108 MMOL/L (ref 98–107)
CK MB SERPL-MCNC: 2.5 NG/ML (ref 0–6.6)
CK MB SERPL-MCNC: 3.8 NG/ML (ref 0–6.6)
CK MB SERPL-MCNC: 3.9 NG/ML (ref 0–6.6)
CO2 SERPL-SCNC: 23 MMOL/L (ref 22–29)
CREAT CL PREDICTED SERPL C-G-VRATE: 0 ML/MIN (ref 70–130)
DIGOXIN SERPL-MCNC: (no result) NG/ML (ref 0.8–2)
EOSINOPHIL # BLD AUTO: 0.1 10X3/UL (ref 0–0.5)
EOSINOPHIL NFR BLD AUTO: 0.8 % (ref 0–6)
GLOBULIN SER CALC-MCNC: 3.4 G/DL (ref 2.4–3.5)
GLUCOSE SERPL-MCNC: 123 MG/DL (ref 70–105)
HGB BLD-MCNC: 11.8 G/DL (ref 13.5–17.5)
INR PPP: 1.1
LYMPHOCYTES NFR BLD AUTO: 19.8 % (ref 18–47)
MAGNESIUM SERPL-MCNC: 2.1 MG/DL (ref 1.6–2.6)
MCH RBC QN AUTO: 23.7 PG (ref 27–33)
MCV RBC AUTO: 78.9 FL (ref 81.2–95.1)
MONOCYTES # BLD AUTO: 0.8 10X3/UL (ref 0–1.1)
MONOCYTES NFR BLD AUTO: 8.2 % (ref 0–10)
NEUTROPHILS # BLD AUTO: 7.2 10X3/UL (ref 1.5–8.4)
NEUTROPHILS NFR BLD AUTO: 70.6 % (ref 40–75)
PLATELET # BLD AUTO: 309 10X3/UL (ref 150–450)
POTASSIUM SERPL-SCNC: 3.4 MMOL/L (ref 3.5–5.1)
PROTHROMBIN TIME: 11.7 SEC (ref 9.5–12.1)
RBC # BLD AUTO: 4.98 10X6/UL (ref 4.32–5.72)
SODIUM SERPL-SCNC: 143 MMOL/L (ref 136–145)
WBC # BLD AUTO: 10.2 10X3/UL (ref 3.5–10.5)

## 2021-08-23 PROCEDURE — 71045 X-RAY EXAM CHEST 1 VIEW: CPT

## 2021-08-23 PROCEDURE — G0378 HOSPITAL OBSERVATION PER HR: HCPCS

## 2021-08-23 PROCEDURE — 80053 COMPREHEN METABOLIC PANEL: CPT

## 2021-08-23 PROCEDURE — 85730 THROMBOPLASTIN TIME PARTIAL: CPT

## 2021-08-23 PROCEDURE — 85610 PROTHROMBIN TIME: CPT

## 2021-08-23 PROCEDURE — 96376 TX/PRO/DX INJ SAME DRUG ADON: CPT

## 2021-08-23 PROCEDURE — 36415 COLL VENOUS BLD VENIPUNCTURE: CPT

## 2021-08-23 PROCEDURE — 80048 BASIC METABOLIC PNL TOTAL CA: CPT

## 2021-08-23 PROCEDURE — 94760 N-INVAS EAR/PLS OXIMETRY 1: CPT

## 2021-08-23 PROCEDURE — 84484 ASSAY OF TROPONIN QUANT: CPT

## 2021-08-23 PROCEDURE — 83735 ASSAY OF MAGNESIUM: CPT

## 2021-08-23 PROCEDURE — 80162 ASSAY OF DIGOXIN TOTAL: CPT

## 2021-08-23 PROCEDURE — 93005 ELECTROCARDIOGRAM TRACING: CPT

## 2021-08-23 PROCEDURE — 96374 THER/PROPH/DIAG INJ IV PUSH: CPT

## 2021-08-23 PROCEDURE — 83880 ASSAY OF NATRIURETIC PEPTIDE: CPT

## 2021-08-23 PROCEDURE — 82553 CREATINE MB FRACTION: CPT

## 2021-08-23 PROCEDURE — 85025 COMPLETE CBC W/AUTO DIFF WBC: CPT

## 2021-08-23 PROCEDURE — 96375 TX/PRO/DX INJ NEW DRUG ADDON: CPT

## 2021-08-23 PROCEDURE — 84443 ASSAY THYROID STIM HORMONE: CPT

## 2021-08-23 PROCEDURE — 0240U: CPT

## 2021-08-24 LAB
ANION GAP SERPL CALC-SCNC: 16 MMOL/L (ref 10–20)
BASOPHILS # BLD AUTO: 0.1 10X3/UL (ref 0–0.2)
BASOPHILS NFR BLD AUTO: 0.5 % (ref 0–2)
BUN SERPL-MCNC: 16 MG/DL (ref 8.4–25.7)
CALCIUM SERPL-MCNC: 9.6 MG/DL (ref 7.8–10.44)
CHLORIDE SERPL-SCNC: 105 MMOL/L (ref 98–107)
CO2 SERPL-SCNC: 25 MMOL/L (ref 22–29)
CREAT CL PREDICTED SERPL C-G-VRATE: 122 ML/MIN (ref 70–130)
EOSINOPHIL # BLD AUTO: 0.1 10X3/UL (ref 0–0.5)
EOSINOPHIL NFR BLD AUTO: 1.3 % (ref 0–6)
GLUCOSE SERPL-MCNC: 94 MG/DL (ref 70–105)
HGB BLD-MCNC: 12.1 G/DL (ref 13.5–17.5)
LYMPHOCYTES NFR BLD AUTO: 21.7 % (ref 18–47)
MCH RBC QN AUTO: 23.7 PG (ref 27–33)
MCV RBC AUTO: 77.6 FL (ref 81.2–95.1)
MONOCYTES # BLD AUTO: 1.2 10X3/UL (ref 0–1.1)
MONOCYTES NFR BLD AUTO: 11.6 % (ref 0–10)
NEUTROPHILS # BLD AUTO: 6.5 10X3/UL (ref 1.5–8.4)
NEUTROPHILS NFR BLD AUTO: 64.6 % (ref 40–75)
PLATELET # BLD AUTO: 293 10X3/UL (ref 150–450)
POTASSIUM SERPL-SCNC: 3.6 MMOL/L (ref 3.5–5.1)
RBC # BLD AUTO: 5.1 10X6/UL (ref 4.32–5.72)
SODIUM SERPL-SCNC: 142 MMOL/L (ref 136–145)
WBC # BLD AUTO: 10 10X3/UL (ref 3.5–10.5)

## 2021-08-25 LAB
ANION GAP SERPL CALC-SCNC: 12 MMOL/L (ref 10–20)
BASOPHILS # BLD AUTO: 0 10X3/UL (ref 0–0.2)
BASOPHILS NFR BLD AUTO: 0.3 % (ref 0–2)
BUN SERPL-MCNC: 15 MG/DL (ref 8.4–25.7)
CALCIUM SERPL-MCNC: 8.8 MG/DL (ref 7.8–10.44)
CHLORIDE SERPL-SCNC: 106 MMOL/L (ref 98–107)
CO2 SERPL-SCNC: 26 MMOL/L (ref 22–29)
CREAT CL PREDICTED SERPL C-G-VRATE: 123 ML/MIN (ref 70–130)
EOSINOPHIL # BLD AUTO: 0.1 10X3/UL (ref 0–0.5)
EOSINOPHIL NFR BLD AUTO: 1.2 % (ref 0–6)
GLUCOSE SERPL-MCNC: 93 MG/DL (ref 70–105)
HGB BLD-MCNC: 10.6 G/DL (ref 13.5–17.5)
LYMPHOCYTES NFR BLD AUTO: 24 % (ref 18–47)
MCH RBC QN AUTO: 23.8 PG (ref 27–33)
MCV RBC AUTO: 78 FL (ref 81.2–95.1)
MONOCYTES # BLD AUTO: 0.9 10X3/UL (ref 0–1.1)
MONOCYTES NFR BLD AUTO: 9.3 % (ref 0–10)
NEUTROPHILS # BLD AUTO: 6.2 10X3/UL (ref 1.5–8.4)
NEUTROPHILS NFR BLD AUTO: 64.8 % (ref 40–75)
PLATELET # BLD AUTO: 284 10X3/UL (ref 150–450)
POTASSIUM SERPL-SCNC: 3.4 MMOL/L (ref 3.5–5.1)
RBC # BLD AUTO: 4.46 10X6/UL (ref 4.32–5.72)
SODIUM SERPL-SCNC: 141 MMOL/L (ref 136–145)
WBC # BLD AUTO: 9.7 10X3/UL (ref 3.5–10.5)

## 2021-08-26 VITALS — SYSTOLIC BLOOD PRESSURE: 151 MMHG | TEMPERATURE: 97.4 F | DIASTOLIC BLOOD PRESSURE: 103 MMHG

## 2021-08-26 LAB
ANION GAP SERPL CALC-SCNC: 11 MMOL/L (ref 10–20)
BASOPHILS # BLD AUTO: 0 10X3/UL (ref 0–0.2)
BASOPHILS NFR BLD AUTO: 0.4 % (ref 0–2)
BUN SERPL-MCNC: 14 MG/DL (ref 8.4–25.7)
CALCIUM SERPL-MCNC: 9.1 MG/DL (ref 7.8–10.44)
CHLORIDE SERPL-SCNC: 104 MMOL/L (ref 98–107)
CO2 SERPL-SCNC: 29 MMOL/L (ref 22–29)
CREAT CL PREDICTED SERPL C-G-VRATE: 126 ML/MIN (ref 70–130)
EOSINOPHIL # BLD AUTO: 0.1 10X3/UL (ref 0–0.5)
EOSINOPHIL NFR BLD AUTO: 1.1 % (ref 0–6)
GLUCOSE SERPL-MCNC: 103 MG/DL (ref 70–105)
HGB BLD-MCNC: 10.7 G/DL (ref 13.5–17.5)
LYMPHOCYTES NFR BLD AUTO: 23.1 % (ref 18–47)
MCH RBC QN AUTO: 24.2 PG (ref 27–33)
MCV RBC AUTO: 78.1 FL (ref 81.2–95.1)
MONOCYTES # BLD AUTO: 1 10X3/UL (ref 0–1.1)
MONOCYTES NFR BLD AUTO: 12.7 % (ref 0–10)
NEUTROPHILS # BLD AUTO: 5.1 10X3/UL (ref 1.5–8.4)
NEUTROPHILS NFR BLD AUTO: 62.3 % (ref 40–75)
PLATELET # BLD AUTO: 297 10X3/UL (ref 150–450)
POTASSIUM SERPL-SCNC: 3.7 MMOL/L (ref 3.5–5.1)
RBC # BLD AUTO: 4.43 10X6/UL (ref 4.32–5.72)
SODIUM SERPL-SCNC: 140 MMOL/L (ref 136–145)
WBC # BLD AUTO: 8.1 10X3/UL (ref 3.5–10.5)

## 2022-01-05 ENCOUNTER — HOSPITAL ENCOUNTER (INPATIENT)
Dept: HOSPITAL 92 - ERS | Age: 57
LOS: 1 days | Discharge: HOME | DRG: 291 | End: 2022-01-06
Attending: STUDENT IN AN ORGANIZED HEALTH CARE EDUCATION/TRAINING PROGRAM | Admitting: STUDENT IN AN ORGANIZED HEALTH CARE EDUCATION/TRAINING PROGRAM
Payer: MEDICARE

## 2022-01-05 VITALS — BODY MASS INDEX: 38.2 KG/M2

## 2022-01-05 DIAGNOSIS — Z86.73: ICD-10-CM

## 2022-01-05 DIAGNOSIS — E78.00: ICD-10-CM

## 2022-01-05 DIAGNOSIS — Z79.01: ICD-10-CM

## 2022-01-05 DIAGNOSIS — G40.909: ICD-10-CM

## 2022-01-05 DIAGNOSIS — I48.91: ICD-10-CM

## 2022-01-05 DIAGNOSIS — Z20.822: ICD-10-CM

## 2022-01-05 DIAGNOSIS — K21.9: ICD-10-CM

## 2022-01-05 DIAGNOSIS — I16.1: ICD-10-CM

## 2022-01-05 DIAGNOSIS — E78.5: ICD-10-CM

## 2022-01-05 DIAGNOSIS — I50.23: ICD-10-CM

## 2022-01-05 DIAGNOSIS — I11.0: Primary | ICD-10-CM

## 2022-01-05 DIAGNOSIS — Z95.5: ICD-10-CM

## 2022-01-05 DIAGNOSIS — R77.8: ICD-10-CM

## 2022-01-05 DIAGNOSIS — Z79.899: ICD-10-CM

## 2022-01-05 DIAGNOSIS — I25.2: ICD-10-CM

## 2022-01-05 DIAGNOSIS — F32.A: ICD-10-CM

## 2022-01-05 LAB
ALBUMIN SERPL BCG-MCNC: 3.7 G/DL (ref 3.5–5)
ALP SERPL-CCNC: 103 U/L (ref 40–110)
ALT SERPL W P-5'-P-CCNC: 18 U/L (ref 8–55)
ANION GAP SERPL CALC-SCNC: 15 MMOL/L (ref 10–20)
AST SERPL-CCNC: 20 U/L (ref 5–34)
BASOPHILS # BLD AUTO: 0 THOU/UL (ref 0–0.2)
BASOPHILS NFR BLD AUTO: 0.5 % (ref 0–1)
BILIRUB SERPL-MCNC: 0.5 MG/DL (ref 0.2–1.2)
BUN SERPL-MCNC: 15 MG/DL (ref 8.4–25.7)
CALCIUM SERPL-MCNC: 9.4 MG/DL (ref 7.8–10.44)
CHLORIDE SERPL-SCNC: 106 MMOL/L (ref 98–107)
CK MB SERPL-MCNC: 1.5 NG/ML (ref 0–6.6)
CO2 SERPL-SCNC: 25 MMOL/L (ref 22–29)
CREAT CL PREDICTED SERPL C-G-VRATE: 0 ML/MIN (ref 70–130)
EOSINOPHIL # BLD AUTO: 0.1 THOU/UL (ref 0–0.7)
EOSINOPHIL NFR BLD AUTO: 1.1 % (ref 0–10)
GLOBULIN SER CALC-MCNC: 3.3 G/DL (ref 2.4–3.5)
GLUCOSE SERPL-MCNC: 106 MG/DL (ref 70–105)
HGB BLD-MCNC: 13.3 G/DL (ref 14–18)
LYMPHOCYTES # BLD: 1.8 THOU/UL (ref 1.2–3.4)
LYMPHOCYTES NFR BLD AUTO: 23 % (ref 21–51)
MCH RBC QN AUTO: 27 PG (ref 27–31)
MCV RBC AUTO: 81.6 FL (ref 78–98)
MONOCYTES # BLD AUTO: 0.8 THOU/UL (ref 0.11–0.59)
MONOCYTES NFR BLD AUTO: 10.2 % (ref 0–10)
NEUTROPHILS # BLD AUTO: 5.1 THOU/UL (ref 1.4–6.5)
NEUTROPHILS NFR BLD AUTO: 65.1 % (ref 42–75)
PLATELET # BLD AUTO: 211 THOU/UL (ref 130–400)
POTASSIUM SERPL-SCNC: 3.8 MMOL/L (ref 3.5–5.1)
RBC # BLD AUTO: 4.93 MILL/UL (ref 4.7–6.1)
SODIUM SERPL-SCNC: 142 MMOL/L (ref 136–145)
TROPONIN I SERPL DL<=0.01 NG/ML-MCNC: 0.02 NG/ML (ref ?–0.03)
TROPONIN I SERPL DL<=0.01 NG/ML-MCNC: 0.03 NG/ML (ref ?–0.03)
WBC # BLD AUTO: 7.8 THOU/UL (ref 4.8–10.8)

## 2022-01-05 PROCEDURE — U0002 COVID-19 LAB TEST NON-CDC: HCPCS

## 2022-01-05 PROCEDURE — 85025 COMPLETE CBC W/AUTO DIFF WBC: CPT

## 2022-01-05 PROCEDURE — 82553 CREATINE MB FRACTION: CPT

## 2022-01-05 PROCEDURE — 85027 COMPLETE CBC AUTOMATED: CPT

## 2022-01-05 PROCEDURE — 93005 ELECTROCARDIOGRAM TRACING: CPT

## 2022-01-05 PROCEDURE — 36415 COLL VENOUS BLD VENIPUNCTURE: CPT

## 2022-01-05 PROCEDURE — 94664 DEMO&/EVAL PT USE INHALER: CPT

## 2022-01-05 PROCEDURE — 80053 COMPREHEN METABOLIC PANEL: CPT

## 2022-01-05 PROCEDURE — 71045 X-RAY EXAM CHEST 1 VIEW: CPT

## 2022-01-05 PROCEDURE — 93306 TTE W/DOPPLER COMPLETE: CPT

## 2022-01-05 PROCEDURE — 84484 ASSAY OF TROPONIN QUANT: CPT

## 2022-01-05 PROCEDURE — 85007 BL SMEAR W/DIFF WBC COUNT: CPT

## 2022-01-05 PROCEDURE — 80048 BASIC METABOLIC PNL TOTAL CA: CPT

## 2022-01-05 PROCEDURE — 83880 ASSAY OF NATRIURETIC PEPTIDE: CPT

## 2022-01-05 RX ADMIN — MOMETASONE FUROATE AND FORMOTEROL FUMARATE DIHYDRATE SCH PUFF: 100; 5 AEROSOL RESPIRATORY (INHALATION) at 19:02

## 2022-01-06 VITALS — DIASTOLIC BLOOD PRESSURE: 62 MMHG | SYSTOLIC BLOOD PRESSURE: 116 MMHG | TEMPERATURE: 98 F

## 2022-01-06 LAB
ANION GAP SERPL CALC-SCNC: 12 MMOL/L (ref 10–20)
BUN SERPL-MCNC: 17 MG/DL (ref 8.4–25.7)
CALCIUM SERPL-MCNC: 8.8 MG/DL (ref 7.8–10.44)
CHLORIDE SERPL-SCNC: 102 MMOL/L (ref 98–107)
CO2 SERPL-SCNC: 29 MMOL/L (ref 22–29)
CREAT CL PREDICTED SERPL C-G-VRATE: 123 ML/MIN (ref 70–130)
GLUCOSE SERPL-MCNC: 94 MG/DL (ref 70–105)
HGB BLD-MCNC: 12.5 G/DL (ref 14–18)
MCH RBC QN AUTO: 26.5 PG (ref 27–31)
MCV RBC AUTO: 82.1 FL (ref 78–98)
MDIFF COMPLETE?: YES
PLATELET # BLD AUTO: 198 THOU/UL (ref 130–400)
POTASSIUM SERPL-SCNC: 3.4 MMOL/L (ref 3.5–5.1)
RBC # BLD AUTO: 4.73 MILL/UL (ref 4.7–6.1)
SODIUM SERPL-SCNC: 140 MMOL/L (ref 136–145)
STOMATOCYTES BLD QL SMEAR: (no result) (100X)
WBC # BLD AUTO: 8.9 THOU/UL (ref 4.8–10.8)

## 2022-01-06 RX ADMIN — MOMETASONE FUROATE AND FORMOTEROL FUMARATE DIHYDRATE SCH: 100; 5 AEROSOL RESPIRATORY (INHALATION) at 10:51

## 2022-02-19 ENCOUNTER — HOSPITAL ENCOUNTER (INPATIENT)
Dept: HOSPITAL 92 - ERS | Age: 57
LOS: 3 days | Discharge: HOME | DRG: 65 | End: 2022-02-22
Attending: INTERNAL MEDICINE | Admitting: INTERNAL MEDICINE
Payer: MEDICARE

## 2022-02-19 VITALS — BODY MASS INDEX: 37.6 KG/M2

## 2022-02-19 DIAGNOSIS — I16.1: ICD-10-CM

## 2022-02-19 DIAGNOSIS — I11.0: ICD-10-CM

## 2022-02-19 DIAGNOSIS — Z79.01: ICD-10-CM

## 2022-02-19 DIAGNOSIS — E78.00: ICD-10-CM

## 2022-02-19 DIAGNOSIS — R47.1: ICD-10-CM

## 2022-02-19 DIAGNOSIS — I65.02: ICD-10-CM

## 2022-02-19 DIAGNOSIS — Z79.899: ICD-10-CM

## 2022-02-19 DIAGNOSIS — R29.701: ICD-10-CM

## 2022-02-19 DIAGNOSIS — E78.5: ICD-10-CM

## 2022-02-19 DIAGNOSIS — I63.89: Primary | ICD-10-CM

## 2022-02-19 DIAGNOSIS — R47.01: ICD-10-CM

## 2022-02-19 DIAGNOSIS — I25.10: ICD-10-CM

## 2022-02-19 DIAGNOSIS — Z91.14: ICD-10-CM

## 2022-02-19 DIAGNOSIS — Z20.822: ICD-10-CM

## 2022-02-19 DIAGNOSIS — R47.81: ICD-10-CM

## 2022-02-19 DIAGNOSIS — Z79.02: ICD-10-CM

## 2022-02-19 DIAGNOSIS — Z95.5: ICD-10-CM

## 2022-02-19 DIAGNOSIS — Z86.73: ICD-10-CM

## 2022-02-19 DIAGNOSIS — I48.91: ICD-10-CM

## 2022-02-19 DIAGNOSIS — I48.20: ICD-10-CM

## 2022-02-19 DIAGNOSIS — I50.22: ICD-10-CM

## 2022-02-19 DIAGNOSIS — K21.9: ICD-10-CM

## 2022-02-19 LAB
ALBUMIN SERPL BCG-MCNC: 4.1 G/DL (ref 3.5–5)
ALP SERPL-CCNC: 128 U/L (ref 40–110)
ALT SERPL W P-5'-P-CCNC: 10 U/L (ref 8–55)
ANION GAP SERPL CALC-SCNC: 11 MMOL/L (ref 10–20)
APTT PPP: 34.1 SEC (ref 22.9–36.1)
AST SERPL-CCNC: 12 U/L (ref 5–34)
BASOPHILS # BLD AUTO: 0 THOU/UL (ref 0–0.2)
BASOPHILS NFR BLD AUTO: 0.6 % (ref 0–1)
BILIRUB SERPL-MCNC: 0.3 MG/DL (ref 0.2–1.2)
BUN SERPL-MCNC: 10 MG/DL (ref 8.4–25.7)
CALCIUM SERPL-MCNC: 9.4 MG/DL (ref 7.8–10.44)
CHLORIDE SERPL-SCNC: 103 MMOL/L (ref 98–107)
CO2 SERPL-SCNC: 30 MMOL/L (ref 22–29)
CREAT CL PREDICTED SERPL C-G-VRATE: 0 ML/MIN (ref 70–130)
EOSINOPHIL # BLD AUTO: 0.1 THOU/UL (ref 0–0.7)
EOSINOPHIL NFR BLD AUTO: 1.1 % (ref 0–10)
GLOBULIN SER CALC-MCNC: 3.5 G/DL (ref 2.4–3.5)
GLUCOSE SERPL-MCNC: 142 MG/DL (ref 70–105)
HGB BLD-MCNC: 15.2 G/DL (ref 14–18)
INR PPP: 1
LYMPHOCYTES # BLD: 1.5 THOU/UL (ref 1.2–3.4)
LYMPHOCYTES NFR BLD AUTO: 20.8 % (ref 21–51)
MCH RBC QN AUTO: 27 PG (ref 27–31)
MCV RBC AUTO: 83.9 FL (ref 78–98)
MONOCYTES # BLD AUTO: 0.5 THOU/UL (ref 0.11–0.59)
MONOCYTES NFR BLD AUTO: 7.6 % (ref 0–10)
NEUTROPHILS # BLD AUTO: 5 THOU/UL (ref 1.4–6.5)
NEUTROPHILS NFR BLD AUTO: 70 % (ref 42–75)
PLATELET # BLD AUTO: 218 THOU/UL (ref 130–400)
POTASSIUM SERPL-SCNC: 3.5 MMOL/L (ref 3.5–5.1)
PROTHROMBIN TIME: 13 SEC (ref 12–14.7)
RBC # BLD AUTO: 5.65 MILL/UL (ref 4.7–6.1)
SODIUM SERPL-SCNC: 140 MMOL/L (ref 136–145)
WBC # BLD AUTO: 7.2 THOU/UL (ref 4.8–10.8)

## 2022-02-19 PROCEDURE — 95957 EEG DIGITAL ANALYSIS: CPT

## 2022-02-19 PROCEDURE — 70553 MRI BRAIN STEM W/O & W/DYE: CPT

## 2022-02-19 PROCEDURE — 93880 EXTRACRANIAL BILAT STUDY: CPT

## 2022-02-19 PROCEDURE — 93005 ELECTROCARDIOGRAM TRACING: CPT

## 2022-02-19 PROCEDURE — 95819 EEG AWAKE AND ASLEEP: CPT

## 2022-02-19 PROCEDURE — 93306 TTE W/DOPPLER COMPLETE: CPT

## 2022-02-19 PROCEDURE — 71045 X-RAY EXAM CHEST 1 VIEW: CPT

## 2022-02-19 PROCEDURE — U0002 COVID-19 LAB TEST NON-CDC: HCPCS

## 2022-02-19 PROCEDURE — 96375 TX/PRO/DX INJ NEW DRUG ADDON: CPT

## 2022-02-19 PROCEDURE — 85610 PROTHROMBIN TIME: CPT

## 2022-02-19 PROCEDURE — 36415 COLL VENOUS BLD VENIPUNCTURE: CPT

## 2022-02-19 PROCEDURE — 85025 COMPLETE CBC W/AUTO DIFF WBC: CPT

## 2022-02-19 PROCEDURE — 70498 CT ANGIOGRAPHY NECK: CPT

## 2022-02-19 PROCEDURE — 84484 ASSAY OF TROPONIN QUANT: CPT

## 2022-02-19 PROCEDURE — 70450 CT HEAD/BRAIN W/O DYE: CPT

## 2022-02-19 PROCEDURE — 85730 THROMBOPLASTIN TIME PARTIAL: CPT

## 2022-02-19 PROCEDURE — A9579 GAD-BASE MR CONTRAST NOS,1ML: HCPCS

## 2022-02-19 PROCEDURE — 96365 THER/PROPH/DIAG IV INF INIT: CPT

## 2022-02-19 PROCEDURE — 80053 COMPREHEN METABOLIC PANEL: CPT

## 2022-02-19 PROCEDURE — 83036 HEMOGLOBIN GLYCOSYLATED A1C: CPT

## 2022-02-19 PROCEDURE — 95712 VEEG 2-12 HR INTMT MNTR: CPT

## 2022-02-19 PROCEDURE — 70496 CT ANGIOGRAPHY HEAD: CPT

## 2022-02-19 PROCEDURE — 80061 LIPID PANEL: CPT

## 2022-02-19 PROCEDURE — 96376 TX/PRO/DX INJ SAME DRUG ADON: CPT

## 2022-02-19 PROCEDURE — 94760 N-INVAS EAR/PLS OXIMETRY 1: CPT

## 2022-02-19 RX ADMIN — MOMETASONE FUROATE AND FORMOTEROL FUMARATE DIHYDRATE SCH PUFF: 100; 5 AEROSOL RESPIRATORY (INHALATION) at 19:09

## 2022-02-20 RX ADMIN — MOMETASONE FUROATE AND FORMOTEROL FUMARATE DIHYDRATE SCH: 100; 5 AEROSOL RESPIRATORY (INHALATION) at 14:04

## 2022-02-20 RX ADMIN — MOMETASONE FUROATE AND FORMOTEROL FUMARATE DIHYDRATE SCH PUFF: 100; 5 AEROSOL RESPIRATORY (INHALATION) at 23:40

## 2022-02-21 LAB
CHD RISK SERPL-RTO: 6.5 (ref ?–4.5)
CHOLEST SERPL-MCNC: 176 MG/DL
HDLC SERPL-MCNC: 27 MG/DL
LDLC SERPL CALC-MCNC: 119 MG/DL
TRIGL SERPL-MCNC: 149 MG/DL (ref ?–150)

## 2022-02-21 RX ADMIN — MOMETASONE FUROATE AND FORMOTEROL FUMARATE DIHYDRATE SCH PUFF: 100; 5 AEROSOL RESPIRATORY (INHALATION) at 20:00

## 2022-02-21 RX ADMIN — MOMETASONE FUROATE AND FORMOTEROL FUMARATE DIHYDRATE SCH PUFF: 100; 5 AEROSOL RESPIRATORY (INHALATION) at 07:57

## 2022-02-21 RX ADMIN — ASPIRIN SCH MG: 81 TABLET ORAL at 08:31

## 2022-02-22 VITALS — DIASTOLIC BLOOD PRESSURE: 95 MMHG | TEMPERATURE: 98.3 F | SYSTOLIC BLOOD PRESSURE: 132 MMHG

## 2022-02-22 RX ADMIN — ASPIRIN SCH MG: 81 TABLET ORAL at 08:56

## 2022-02-22 RX ADMIN — MOMETASONE FUROATE AND FORMOTEROL FUMARATE DIHYDRATE SCH: 100; 5 AEROSOL RESPIRATORY (INHALATION) at 10:43

## 2022-05-11 ENCOUNTER — HOSPITAL ENCOUNTER (INPATIENT)
Dept: HOSPITAL 92 - ERS | Age: 57
LOS: 4 days | Discharge: HOME | DRG: 309 | End: 2022-05-15
Attending: FAMILY MEDICINE | Admitting: FAMILY MEDICINE
Payer: MEDICARE

## 2022-05-11 VITALS — BODY MASS INDEX: 36.3 KG/M2

## 2022-05-11 DIAGNOSIS — Z79.01: ICD-10-CM

## 2022-05-11 DIAGNOSIS — Z79.899: ICD-10-CM

## 2022-05-11 DIAGNOSIS — I50.22: ICD-10-CM

## 2022-05-11 DIAGNOSIS — Z20.822: ICD-10-CM

## 2022-05-11 DIAGNOSIS — Z79.82: ICD-10-CM

## 2022-05-11 DIAGNOSIS — Z86.73: ICD-10-CM

## 2022-05-11 DIAGNOSIS — Z91.14: ICD-10-CM

## 2022-05-11 DIAGNOSIS — I25.2: ICD-10-CM

## 2022-05-11 DIAGNOSIS — I11.0: ICD-10-CM

## 2022-05-11 DIAGNOSIS — E78.5: ICD-10-CM

## 2022-05-11 DIAGNOSIS — Z95.5: ICD-10-CM

## 2022-05-11 DIAGNOSIS — I16.1: ICD-10-CM

## 2022-05-11 DIAGNOSIS — I16.0: ICD-10-CM

## 2022-05-11 DIAGNOSIS — I48.91: Primary | ICD-10-CM

## 2022-05-11 DIAGNOSIS — K21.9: ICD-10-CM

## 2022-05-11 DIAGNOSIS — I49.3: ICD-10-CM

## 2022-05-11 LAB
ALBUMIN SERPL BCG-MCNC: 3.9 G/DL (ref 3.5–5)
ALP SERPL-CCNC: 105 U/L (ref 40–110)
ALT SERPL W P-5'-P-CCNC: 20 U/L (ref 8–55)
ANION GAP SERPL CALC-SCNC: 14 MMOL/L (ref 10–20)
AST SERPL-CCNC: 15 U/L (ref 5–34)
BASOPHILS # BLD AUTO: 0 THOU/UL (ref 0–0.2)
BASOPHILS NFR BLD AUTO: 0.5 % (ref 0–1)
BILIRUB SERPL-MCNC: 0.7 MG/DL (ref 0.2–1.2)
BUN SERPL-MCNC: 11 MG/DL (ref 8.4–25.7)
CALCIUM SERPL-MCNC: 8.9 MG/DL (ref 7.8–10.44)
CHLORIDE SERPL-SCNC: 105 MMOL/L (ref 98–107)
CK MB SERPL-MCNC: 1.6 NG/ML (ref 0–6.6)
CO2 SERPL-SCNC: 26 MMOL/L (ref 22–29)
CREAT CL PREDICTED SERPL C-G-VRATE: 0 ML/MIN (ref 70–130)
DIGOXIN SERPL-MCNC: (no result) NG/ML (ref 0.8–2)
EOSINOPHIL # BLD AUTO: 0.1 THOU/UL (ref 0–0.7)
EOSINOPHIL NFR BLD AUTO: 1 % (ref 0–10)
GLOBULIN SER CALC-MCNC: 3.2 G/DL (ref 2.4–3.5)
GLUCOSE SERPL-MCNC: 118 MG/DL (ref 70–105)
HGB BLD-MCNC: 14.4 G/DL (ref 14–18)
LIPASE SERPL-CCNC: 16 U/L (ref 8–78)
LYMPHOCYTES # BLD: 1.3 THOU/UL (ref 1.2–3.4)
LYMPHOCYTES NFR BLD AUTO: 15.5 % (ref 21–51)
MCH RBC QN AUTO: 27 PG (ref 27–31)
MCV RBC AUTO: 85.3 FL (ref 78–98)
MONOCYTES # BLD AUTO: 0.7 THOU/UL (ref 0.11–0.59)
MONOCYTES NFR BLD AUTO: 8.2 % (ref 0–10)
NEUTROPHILS # BLD AUTO: 6.4 THOU/UL (ref 1.4–6.5)
NEUTROPHILS NFR BLD AUTO: 74.8 % (ref 42–75)
PLATELET # BLD AUTO: 223 THOU/UL (ref 130–400)
POTASSIUM SERPL-SCNC: 3.7 MMOL/L (ref 3.5–5.1)
RBC # BLD AUTO: 5.32 MILL/UL (ref 4.7–6.1)
SODIUM SERPL-SCNC: 141 MMOL/L (ref 136–145)
WBC # BLD AUTO: 8.6 THOU/UL (ref 4.8–10.8)

## 2022-05-11 PROCEDURE — 82553 CREATINE MB FRACTION: CPT

## 2022-05-11 PROCEDURE — 83690 ASSAY OF LIPASE: CPT

## 2022-05-11 PROCEDURE — U0003 INFECTIOUS AGENT DETECTION BY NUCLEIC ACID (DNA OR RNA); SEVERE ACUTE RESPIRATORY SYNDROME CORONAVIRUS 2 (SARS-COV-2) (CORONAVIRUS DISEASE [COVID-19]), AMPLIFIED PROBE TECHNIQUE, MAKING USE OF HIGH THROUGHPUT TECHNOLOGIES AS DESCRIBED BY CMS-2020-01-R: HCPCS

## 2022-05-11 PROCEDURE — 80162 ASSAY OF DIGOXIN TOTAL: CPT

## 2022-05-11 PROCEDURE — U0005 INFEC AGEN DETEC AMPLI PROBE: HCPCS

## 2022-05-11 PROCEDURE — 80053 COMPREHEN METABOLIC PANEL: CPT

## 2022-05-11 PROCEDURE — 93005 ELECTROCARDIOGRAM TRACING: CPT

## 2022-05-11 PROCEDURE — 84443 ASSAY THYROID STIM HORMONE: CPT

## 2022-05-11 PROCEDURE — 84484 ASSAY OF TROPONIN QUANT: CPT

## 2022-05-11 PROCEDURE — 36415 COLL VENOUS BLD VENIPUNCTURE: CPT

## 2022-05-11 PROCEDURE — 85025 COMPLETE CBC W/AUTO DIFF WBC: CPT

## 2022-05-11 PROCEDURE — 71045 X-RAY EXAM CHEST 1 VIEW: CPT

## 2022-05-11 PROCEDURE — 83880 ASSAY OF NATRIURETIC PEPTIDE: CPT

## 2022-05-12 RX ADMIN — ASPIRIN SCH MG: 81 TABLET ORAL at 08:21

## 2022-05-12 RX ADMIN — MOMETASONE FUROATE AND FORMOTEROL FUMARATE DIHYDRATE SCH PUFF: 100; 5 AEROSOL RESPIRATORY (INHALATION) at 07:36

## 2022-05-12 RX ADMIN — MOMETASONE FUROATE AND FORMOTEROL FUMARATE DIHYDRATE SCH PUFF: 100; 5 AEROSOL RESPIRATORY (INHALATION) at 19:25

## 2022-05-13 LAB
ALBUMIN SERPL BCG-MCNC: 3.5 G/DL (ref 3.5–5)
ALP SERPL-CCNC: 85 U/L (ref 40–110)
ALT SERPL W P-5'-P-CCNC: 17 U/L (ref 8–55)
ANION GAP SERPL CALC-SCNC: 12 MMOL/L (ref 10–20)
AST SERPL-CCNC: 12 U/L (ref 5–34)
BASOPHILS # BLD AUTO: 0 THOU/UL (ref 0–0.2)
BASOPHILS NFR BLD AUTO: 0.5 % (ref 0–1)
BILIRUB SERPL-MCNC: 0.8 MG/DL (ref 0.2–1.2)
BUN SERPL-MCNC: 15 MG/DL (ref 8.4–25.7)
CALCIUM SERPL-MCNC: 8.6 MG/DL (ref 7.8–10.44)
CHLORIDE SERPL-SCNC: 106 MMOL/L (ref 98–107)
CO2 SERPL-SCNC: 23 MMOL/L (ref 22–29)
CREAT CL PREDICTED SERPL C-G-VRATE: 118 ML/MIN (ref 70–130)
DIGOXIN SERPL-MCNC: (no result) NG/ML (ref 0.8–2)
EOSINOPHIL # BLD AUTO: 0.1 THOU/UL (ref 0–0.7)
EOSINOPHIL NFR BLD AUTO: 0.9 % (ref 0–10)
GLOBULIN SER CALC-MCNC: 3.4 G/DL (ref 2.4–3.5)
GLUCOSE SERPL-MCNC: 112 MG/DL (ref 70–105)
HGB BLD-MCNC: 13.4 G/DL (ref 14–18)
LYMPHOCYTES # BLD: 2.2 THOU/UL (ref 1.2–3.4)
LYMPHOCYTES NFR BLD AUTO: 20.7 % (ref 21–51)
MCH RBC QN AUTO: 26.7 PG (ref 27–31)
MCV RBC AUTO: 85.2 FL (ref 78–98)
MONOCYTES # BLD AUTO: 1.1 THOU/UL (ref 0.11–0.59)
MONOCYTES NFR BLD AUTO: 10.9 % (ref 0–10)
NEUTROPHILS # BLD AUTO: 7 THOU/UL (ref 1.4–6.5)
NEUTROPHILS NFR BLD AUTO: 67 % (ref 42–75)
PLATELET # BLD AUTO: 223 THOU/UL (ref 130–400)
POTASSIUM SERPL-SCNC: 3.6 MMOL/L (ref 3.5–5.1)
RBC # BLD AUTO: 5.03 MILL/UL (ref 4.7–6.1)
SODIUM SERPL-SCNC: 137 MMOL/L (ref 136–145)
WBC # BLD AUTO: 10.5 THOU/UL (ref 4.8–10.8)

## 2022-05-13 RX ADMIN — ASPIRIN SCH MG: 81 TABLET ORAL at 08:07

## 2022-05-13 RX ADMIN — MOMETASONE FUROATE AND FORMOTEROL FUMARATE DIHYDRATE SCH PUFF: 100; 5 AEROSOL RESPIRATORY (INHALATION) at 18:43

## 2022-05-13 RX ADMIN — MOMETASONE FUROATE AND FORMOTEROL FUMARATE DIHYDRATE SCH PUFF: 100; 5 AEROSOL RESPIRATORY (INHALATION) at 06:42

## 2022-05-14 LAB
ALBUMIN SERPL BCG-MCNC: 3.4 G/DL (ref 3.5–5)
ALP SERPL-CCNC: 92 U/L (ref 40–110)
ALT SERPL W P-5'-P-CCNC: 15 U/L (ref 8–55)
ANION GAP SERPL CALC-SCNC: 12 MMOL/L (ref 10–20)
AST SERPL-CCNC: 13 U/L (ref 5–34)
BASOPHILS # BLD AUTO: 0.1 THOU/UL (ref 0–0.2)
BASOPHILS NFR BLD AUTO: 0.8 % (ref 0–1)
BILIRUB SERPL-MCNC: 0.5 MG/DL (ref 0.2–1.2)
BUN SERPL-MCNC: 18 MG/DL (ref 8.4–25.7)
CALCIUM SERPL-MCNC: 8.8 MG/DL (ref 7.8–10.44)
CHLORIDE SERPL-SCNC: 106 MMOL/L (ref 98–107)
CO2 SERPL-SCNC: 25 MMOL/L (ref 22–29)
CREAT CL PREDICTED SERPL C-G-VRATE: 119 ML/MIN (ref 70–130)
DIGOXIN SERPL-MCNC: 0.76 NG/ML (ref 0.8–2)
EOSINOPHIL # BLD AUTO: 0.2 THOU/UL (ref 0–0.7)
EOSINOPHIL NFR BLD AUTO: 1.9 % (ref 0–10)
GLOBULIN SER CALC-MCNC: 3.4 G/DL (ref 2.4–3.5)
GLUCOSE SERPL-MCNC: 103 MG/DL (ref 70–105)
HGB BLD-MCNC: 13 G/DL (ref 14–18)
LYMPHOCYTES # BLD: 2.1 THOU/UL (ref 1.2–3.4)
LYMPHOCYTES NFR BLD AUTO: 22.5 % (ref 21–51)
MCH RBC QN AUTO: 27 PG (ref 27–31)
MCV RBC AUTO: 85.3 FL (ref 78–98)
MONOCYTES # BLD AUTO: 1.3 THOU/UL (ref 0.11–0.59)
MONOCYTES NFR BLD AUTO: 13.7 % (ref 0–10)
NEUTROPHILS # BLD AUTO: 5.8 THOU/UL (ref 1.4–6.5)
NEUTROPHILS NFR BLD AUTO: 61.1 % (ref 42–75)
PLATELET # BLD AUTO: 231 THOU/UL (ref 130–400)
POTASSIUM SERPL-SCNC: 3.9 MMOL/L (ref 3.5–5.1)
RBC # BLD AUTO: 4.83 MILL/UL (ref 4.7–6.1)
SODIUM SERPL-SCNC: 139 MMOL/L (ref 136–145)
WBC # BLD AUTO: 9.4 THOU/UL (ref 4.8–10.8)

## 2022-05-14 RX ADMIN — ASPIRIN SCH MG: 81 TABLET ORAL at 10:12

## 2022-05-14 RX ADMIN — MOMETASONE FUROATE AND FORMOTEROL FUMARATE DIHYDRATE SCH PUFF: 100; 5 AEROSOL RESPIRATORY (INHALATION) at 18:49

## 2022-05-14 RX ADMIN — MOMETASONE FUROATE AND FORMOTEROL FUMARATE DIHYDRATE SCH PUFF: 100; 5 AEROSOL RESPIRATORY (INHALATION) at 07:20

## 2022-05-15 VITALS — TEMPERATURE: 98.5 F

## 2022-05-15 VITALS — DIASTOLIC BLOOD PRESSURE: 90 MMHG | SYSTOLIC BLOOD PRESSURE: 141 MMHG

## 2022-05-15 RX ADMIN — MOMETASONE FUROATE AND FORMOTEROL FUMARATE DIHYDRATE SCH PUFF: 100; 5 AEROSOL RESPIRATORY (INHALATION) at 07:19

## 2022-05-15 RX ADMIN — ASPIRIN SCH MG: 81 TABLET ORAL at 09:49

## 2022-08-09 ENCOUNTER — HOSPITAL ENCOUNTER (EMERGENCY)
Dept: HOSPITAL 92 - ERS | Age: 57
Discharge: ANOTHER HOSPITAL | End: 2022-08-09
Payer: MEDICARE

## 2022-08-09 ENCOUNTER — HOSPITAL ENCOUNTER (INPATIENT)
Dept: HOSPITAL 92 - CSHTELE | Age: 57
LOS: 1 days | Discharge: HOME | DRG: 308 | End: 2022-08-10
Attending: FAMILY MEDICINE | Admitting: INTERNAL MEDICINE
Payer: MEDICARE

## 2022-08-09 VITALS — BODY MASS INDEX: 32.6 KG/M2

## 2022-08-09 DIAGNOSIS — J45.901: ICD-10-CM

## 2022-08-09 DIAGNOSIS — I50.33: ICD-10-CM

## 2022-08-09 DIAGNOSIS — I11.0: ICD-10-CM

## 2022-08-09 DIAGNOSIS — Z91.19: ICD-10-CM

## 2022-08-09 DIAGNOSIS — I48.91: ICD-10-CM

## 2022-08-09 DIAGNOSIS — Z86.73: ICD-10-CM

## 2022-08-09 DIAGNOSIS — I69.354: ICD-10-CM

## 2022-08-09 DIAGNOSIS — Z79.01: ICD-10-CM

## 2022-08-09 DIAGNOSIS — E78.00: ICD-10-CM

## 2022-08-09 DIAGNOSIS — I42.9: ICD-10-CM

## 2022-08-09 DIAGNOSIS — I73.9: ICD-10-CM

## 2022-08-09 DIAGNOSIS — Z79.899: ICD-10-CM

## 2022-08-09 DIAGNOSIS — R77.8: ICD-10-CM

## 2022-08-09 DIAGNOSIS — E78.5: ICD-10-CM

## 2022-08-09 DIAGNOSIS — I48.91: Primary | ICD-10-CM

## 2022-08-09 DIAGNOSIS — I25.2: ICD-10-CM

## 2022-08-09 DIAGNOSIS — Z95.2: ICD-10-CM

## 2022-08-09 DIAGNOSIS — Z20.822: ICD-10-CM

## 2022-08-09 DIAGNOSIS — E87.6: ICD-10-CM

## 2022-08-09 DIAGNOSIS — K21.9: ICD-10-CM

## 2022-08-09 DIAGNOSIS — I50.9: ICD-10-CM

## 2022-08-09 DIAGNOSIS — I24.8: ICD-10-CM

## 2022-08-09 DIAGNOSIS — J45.901: Primary | ICD-10-CM

## 2022-08-09 LAB
ALBUMIN SERPL BCG-MCNC: 3.9 G/DL (ref 3.5–5)
ALP SERPL-CCNC: 112 U/L (ref 40–110)
ALT SERPL W P-5'-P-CCNC: 36 U/L (ref 8–55)
ANION GAP SERPL CALC-SCNC: 14 MMOL/L (ref 10–20)
AST SERPL-CCNC: 30 U/L (ref 5–34)
BASOPHILS # BLD AUTO: 0.1 THOU/UL (ref 0–0.2)
BASOPHILS NFR BLD AUTO: 0.6 % (ref 0–1)
BILIRUB SERPL-MCNC: 0.8 MG/DL (ref 0.2–1.2)
BUN SERPL-MCNC: 14 MG/DL (ref 8.4–25.7)
CALCIUM SERPL-MCNC: 9.2 MG/DL (ref 7.8–10.44)
CHLORIDE SERPL-SCNC: 104 MMOL/L (ref 98–107)
CK MB SERPL-MCNC: 2.5 NG/ML (ref 0–6.6)
CO2 SERPL-SCNC: 27 MMOL/L (ref 22–29)
CREAT CL PREDICTED SERPL C-G-VRATE: 0 ML/MIN (ref 70–130)
EOSINOPHIL # BLD AUTO: 0.1 THOU/UL (ref 0–0.7)
EOSINOPHIL NFR BLD AUTO: 0.6 % (ref 0–10)
GLOBULIN SER CALC-MCNC: 3.1 G/DL (ref 2.4–3.5)
GLUCOSE SERPL-MCNC: 124 MG/DL (ref 70–105)
HGB BLD-MCNC: 13.4 G/DL (ref 14–18)
LYMPHOCYTES # BLD: 1.9 THOU/UL (ref 1.2–3.4)
LYMPHOCYTES NFR BLD AUTO: 20.1 % (ref 21–51)
MAGNESIUM SERPL-MCNC: 2.1 MG/DL (ref 1.6–2.6)
MCH RBC QN AUTO: 27.9 PG (ref 27–31)
MCV RBC AUTO: 85.1 FL (ref 78–98)
MONOCYTES # BLD AUTO: 0.8 THOU/UL (ref 0.11–0.59)
MONOCYTES NFR BLD AUTO: 8.2 % (ref 0–10)
NEUTROPHILS # BLD AUTO: 6.5 THOU/UL (ref 1.4–6.5)
NEUTROPHILS NFR BLD AUTO: 70.5 % (ref 42–75)
PLATELET # BLD AUTO: 223 THOU/UL (ref 130–400)
POTASSIUM SERPL-SCNC: 3.4 MMOL/L (ref 3.5–5.1)
RBC # BLD AUTO: 4.82 MILL/UL (ref 4.7–6.1)
SODIUM SERPL-SCNC: 142 MMOL/L (ref 136–145)
TROPONIN I SERPL DL<=0.01 NG/ML-MCNC: 0.02 NG/ML (ref ?–0.03)
TROPONIN I SERPL DL<=0.01 NG/ML-MCNC: 0.03 NG/ML (ref ?–0.03)
WBC # BLD AUTO: 9.3 THOU/UL (ref 4.8–10.8)

## 2022-08-09 PROCEDURE — 36415 COLL VENOUS BLD VENIPUNCTURE: CPT

## 2022-08-09 PROCEDURE — 99285 EMERGENCY DEPT VISIT HI MDM: CPT

## 2022-08-09 PROCEDURE — 83880 ASSAY OF NATRIURETIC PEPTIDE: CPT

## 2022-08-09 PROCEDURE — 94640 AIRWAY INHALATION TREATMENT: CPT

## 2022-08-09 PROCEDURE — 83735 ASSAY OF MAGNESIUM: CPT

## 2022-08-09 PROCEDURE — 80048 BASIC METABOLIC PNL TOTAL CA: CPT

## 2022-08-09 PROCEDURE — 96366 THER/PROPH/DIAG IV INF ADDON: CPT

## 2022-08-09 PROCEDURE — 85025 COMPLETE CBC W/AUTO DIFF WBC: CPT

## 2022-08-09 PROCEDURE — 84484 ASSAY OF TROPONIN QUANT: CPT

## 2022-08-09 PROCEDURE — 94664 DEMO&/EVAL PT USE INHALER: CPT

## 2022-08-09 PROCEDURE — 93005 ELECTROCARDIOGRAM TRACING: CPT

## 2022-08-09 PROCEDURE — 82553 CREATINE MB FRACTION: CPT

## 2022-08-09 PROCEDURE — 94760 N-INVAS EAR/PLS OXIMETRY 1: CPT

## 2022-08-09 PROCEDURE — 96375 TX/PRO/DX INJ NEW DRUG ADDON: CPT

## 2022-08-09 PROCEDURE — 96365 THER/PROPH/DIAG IV INF INIT: CPT

## 2022-08-09 PROCEDURE — U0002 COVID-19 LAB TEST NON-CDC: HCPCS

## 2022-08-09 PROCEDURE — 71045 X-RAY EXAM CHEST 1 VIEW: CPT

## 2022-08-09 PROCEDURE — 80053 COMPREHEN METABOLIC PANEL: CPT

## 2022-08-09 RX ADMIN — MOMETASONE FUROATE AND FORMOTEROL FUMARATE DIHYDRATE SCH PUFF: 100; 5 AEROSOL RESPIRATORY (INHALATION) at 18:45

## 2022-08-10 VITALS — TEMPERATURE: 97.2 F | DIASTOLIC BLOOD PRESSURE: 67 MMHG | SYSTOLIC BLOOD PRESSURE: 138 MMHG

## 2022-08-10 LAB
ANION GAP SERPL CALC-SCNC: 15 MMOL/L (ref 10–20)
BASOPHILS # BLD AUTO: 0 10X3/UL (ref 0–0.2)
BASOPHILS NFR BLD AUTO: 0.2 % (ref 0–2)
BUN SERPL-MCNC: 23 MG/DL (ref 8.4–25.7)
CALCIUM SERPL-MCNC: 9 MG/DL (ref 7.8–10.44)
CHLORIDE SERPL-SCNC: 100 MMOL/L (ref 98–107)
CO2 SERPL-SCNC: 26 MMOL/L (ref 22–29)
CREAT CL PREDICTED SERPL C-G-VRATE: 87 ML/MIN (ref 70–130)
EOSINOPHIL # BLD AUTO: 0 10X3/UL (ref 0–0.5)
EOSINOPHIL NFR BLD AUTO: 0 % (ref 0–6)
GLUCOSE SERPL-MCNC: 154 MG/DL (ref 70–105)
HGB BLD-MCNC: 12.9 G/DL (ref 13.5–17.5)
LYMPHOCYTES NFR BLD AUTO: 5.6 % (ref 18–47)
MCH RBC QN AUTO: 26.2 PG (ref 27–33)
MCV RBC AUTO: 81.1 FL (ref 81.2–95.1)
MONOCYTES # BLD AUTO: 1.4 10X3/UL (ref 0–1.1)
MONOCYTES NFR BLD AUTO: 7.1 % (ref 0–10)
NEUTROPHILS # BLD AUTO: 16.9 10X3/UL (ref 1.5–8.4)
NEUTROPHILS NFR BLD AUTO: 86.6 % (ref 40–75)
PLATELET # BLD AUTO: 276 10X3/UL (ref 150–450)
POTASSIUM SERPL-SCNC: 3.6 MMOL/L (ref 3.5–5.1)
RBC # BLD AUTO: 4.92 10X6/UL (ref 4.32–5.72)
SODIUM SERPL-SCNC: 137 MMOL/L (ref 136–145)
WBC # BLD AUTO: 19.5 10X3/UL (ref 3.5–10.5)

## 2022-08-10 RX ADMIN — MOMETASONE FUROATE AND FORMOTEROL FUMARATE DIHYDRATE SCH PUFF: 100; 5 AEROSOL RESPIRATORY (INHALATION) at 20:05

## 2022-08-10 RX ADMIN — MOMETASONE FUROATE AND FORMOTEROL FUMARATE DIHYDRATE SCH PUFF: 100; 5 AEROSOL RESPIRATORY (INHALATION) at 07:57

## 2022-09-15 ENCOUNTER — HOSPITAL ENCOUNTER (INPATIENT)
Dept: HOSPITAL 92 - ERS | Age: 57
LOS: 6 days | Discharge: HOME | DRG: 291 | End: 2022-09-21
Attending: FAMILY MEDICINE | Admitting: FAMILY MEDICINE
Payer: MEDICARE

## 2022-09-15 VITALS — BODY MASS INDEX: 35.3 KG/M2

## 2022-09-15 DIAGNOSIS — E87.6: ICD-10-CM

## 2022-09-15 DIAGNOSIS — Z79.899: ICD-10-CM

## 2022-09-15 DIAGNOSIS — Z79.82: ICD-10-CM

## 2022-09-15 DIAGNOSIS — I25.2: ICD-10-CM

## 2022-09-15 DIAGNOSIS — I47.1: ICD-10-CM

## 2022-09-15 DIAGNOSIS — I11.0: Primary | ICD-10-CM

## 2022-09-15 DIAGNOSIS — I16.1: ICD-10-CM

## 2022-09-15 DIAGNOSIS — F17.210: ICD-10-CM

## 2022-09-15 DIAGNOSIS — K21.9: ICD-10-CM

## 2022-09-15 DIAGNOSIS — I50.33: ICD-10-CM

## 2022-09-15 DIAGNOSIS — Z79.01: ICD-10-CM

## 2022-09-15 DIAGNOSIS — G40.909: ICD-10-CM

## 2022-09-15 DIAGNOSIS — Z20.822: ICD-10-CM

## 2022-09-15 DIAGNOSIS — R74.01: ICD-10-CM

## 2022-09-15 DIAGNOSIS — I73.9: ICD-10-CM

## 2022-09-15 DIAGNOSIS — I48.20: ICD-10-CM

## 2022-09-15 DIAGNOSIS — I25.10: ICD-10-CM

## 2022-09-15 DIAGNOSIS — Z86.73: ICD-10-CM

## 2022-09-15 DIAGNOSIS — E78.5: ICD-10-CM

## 2022-09-15 DIAGNOSIS — R77.8: ICD-10-CM

## 2022-09-15 DIAGNOSIS — Z95.5: ICD-10-CM

## 2022-09-15 DIAGNOSIS — J96.21: ICD-10-CM

## 2022-09-15 LAB
ALBUMIN SERPL BCG-MCNC: 3.5 G/DL (ref 3.5–5)
ALP SERPL-CCNC: 115 U/L (ref 40–110)
ALT SERPL W P-5'-P-CCNC: 60 U/L (ref 8–55)
ANALYZER IN CARDIO: (no result)
ANION GAP SERPL CALC-SCNC: 13 MMOL/L (ref 10–20)
AST SERPL-CCNC: 50 U/L (ref 5–34)
BASE EXCESS STD BLDV CALC-SCNC: -0.1 MEQ/L
BASOPHILS # BLD AUTO: 0 THOU/UL (ref 0–0.2)
BASOPHILS NFR BLD AUTO: 0.5 % (ref 0–1)
BILIRUB SERPL-MCNC: 1.1 MG/DL (ref 0.2–1.2)
BUN SERPL-MCNC: 14 MG/DL (ref 8.4–25.7)
CA-I BLDV-MCNC: 1.13 MMOL/L (ref 1.16–1.32)
CALCIUM SERPL-MCNC: 8.7 MG/DL (ref 7.8–10.44)
CHLORIDE BLDV-SCNC: 106 MMOL/L (ref 98–106)
CHLORIDE SERPL-SCNC: 108 MMOL/L (ref 98–107)
CK MB SERPL-MCNC: 2.3 NG/ML (ref 0–6.6)
CO2 SERPL-SCNC: 25 MMOL/L (ref 22–29)
CREAT CL PREDICTED SERPL C-G-VRATE: 0 ML/MIN (ref 70–130)
DIGOXIN SERPL-MCNC: (no result) NG/ML (ref 0.8–2)
EOSINOPHIL # BLD AUTO: 0.1 THOU/UL (ref 0–0.7)
EOSINOPHIL NFR BLD AUTO: 0.8 % (ref 0–10)
GLOBULIN SER CALC-MCNC: 2.7 G/DL (ref 2.4–3.5)
GLUCOSE SERPL-MCNC: 113 MG/DL (ref 70–105)
HCO3 BLDV-SCNC: 24 MEQ/L (ref 22–28)
HCT VFR BLDV CALC: 39 % (ref 42–52)
HGB BLD-MCNC: 12.3 G/DL (ref 14–18)
HGB BLDV-MCNC: 13.3 G/DL (ref 13.1–17.2)
LYMPHOCYTES # BLD: 2.1 THOU/UL (ref 1.2–3.4)
LYMPHOCYTES NFR BLD AUTO: 23.7 % (ref 21–51)
MAGNESIUM SERPL-MCNC: 2.4 MG/DL (ref 1.6–2.6)
MCH RBC QN AUTO: 26.7 PG (ref 27–31)
MCV RBC AUTO: 85.9 FL (ref 78–98)
MONOCYTES # BLD AUTO: 0.8 THOU/UL (ref 0.11–0.59)
MONOCYTES NFR BLD AUTO: 9.4 % (ref 0–10)
NEUTROPHILS # BLD AUTO: 5.8 THOU/UL (ref 1.4–6.5)
NEUTROPHILS NFR BLD AUTO: 65.6 % (ref 42–75)
PLATELET # BLD AUTO: 182 THOU/UL (ref 130–400)
POTASSIUM BLDV-SCNC: 3.32 MMOL/L (ref 3.7–5.3)
POTASSIUM SERPL-SCNC: 3.6 MMOL/L (ref 3.5–5.1)
RBC # BLD AUTO: 4.62 MILL/UL (ref 4.7–6.1)
SODIUM BLDV-SCNC: 140.2 MMOL/L (ref 133–146)
SODIUM SERPL-SCNC: 142 MMOL/L (ref 136–145)
TROPONIN I SERPL DL<=0.01 NG/ML-MCNC: 0.03 NG/ML (ref ?–0.03)
TROPONIN I SERPL DL<=0.01 NG/ML-MCNC: 0.04 NG/ML (ref ?–0.03)
WBC # BLD AUTO: 8.8 THOU/UL (ref 4.8–10.8)

## 2022-09-15 PROCEDURE — 96376 TX/PRO/DX INJ SAME DRUG ADON: CPT

## 2022-09-15 PROCEDURE — 80048 BASIC METABOLIC PNL TOTAL CA: CPT

## 2022-09-15 PROCEDURE — 84132 ASSAY OF SERUM POTASSIUM: CPT

## 2022-09-15 PROCEDURE — 82805 BLOOD GASES W/O2 SATURATION: CPT

## 2022-09-15 PROCEDURE — 96375 TX/PRO/DX INJ NEW DRUG ADDON: CPT

## 2022-09-15 PROCEDURE — 94664 DEMO&/EVAL PT USE INHALER: CPT

## 2022-09-15 PROCEDURE — 96365 THER/PROPH/DIAG IV INF INIT: CPT

## 2022-09-15 PROCEDURE — 84100 ASSAY OF PHOSPHORUS: CPT

## 2022-09-15 PROCEDURE — 84484 ASSAY OF TROPONIN QUANT: CPT

## 2022-09-15 PROCEDURE — 5A09357 ASSISTANCE WITH RESPIRATORY VENTILATION, LESS THAN 24 CONSECUTIVE HOURS, CONTINUOUS POSITIVE AIRWAY PRESSURE: ICD-10-PCS | Performed by: STUDENT IN AN ORGANIZED HEALTH CARE EDUCATION/TRAINING PROGRAM

## 2022-09-15 PROCEDURE — 80053 COMPREHEN METABOLIC PANEL: CPT

## 2022-09-15 PROCEDURE — 71045 X-RAY EXAM CHEST 1 VIEW: CPT

## 2022-09-15 PROCEDURE — U0002 COVID-19 LAB TEST NON-CDC: HCPCS

## 2022-09-15 PROCEDURE — 83735 ASSAY OF MAGNESIUM: CPT

## 2022-09-15 PROCEDURE — 82553 CREATINE MB FRACTION: CPT

## 2022-09-15 PROCEDURE — 83880 ASSAY OF NATRIURETIC PEPTIDE: CPT

## 2022-09-15 PROCEDURE — 80162 ASSAY OF DIGOXIN TOTAL: CPT

## 2022-09-15 PROCEDURE — 85025 COMPLETE CBC W/AUTO DIFF WBC: CPT

## 2022-09-15 PROCEDURE — 36415 COLL VENOUS BLD VENIPUNCTURE: CPT

## 2022-09-15 PROCEDURE — 94660 CPAP INITIATION&MGMT: CPT

## 2022-09-15 PROCEDURE — 87811 SARS-COV-2 COVID19 W/OPTIC: CPT

## 2022-09-15 PROCEDURE — 94640 AIRWAY INHALATION TREATMENT: CPT

## 2022-09-15 PROCEDURE — 93005 ELECTROCARDIOGRAM TRACING: CPT

## 2022-09-15 RX ADMIN — Medication SCH ML: at 21:24

## 2022-09-15 RX ADMIN — Medication PRN ML: at 13:45

## 2022-09-16 LAB
ANION GAP SERPL CALC-SCNC: 11 MMOL/L (ref 10–20)
BASOPHILS # BLD AUTO: 0 THOU/UL (ref 0–0.2)
BASOPHILS NFR BLD AUTO: 0.1 % (ref 0–1)
BUN SERPL-MCNC: 14 MG/DL (ref 8.4–25.7)
CALCIUM SERPL-MCNC: 9 MG/DL (ref 7.8–10.44)
CHLORIDE SERPL-SCNC: 100 MMOL/L (ref 98–107)
CO2 SERPL-SCNC: 31 MMOL/L (ref 22–29)
CREAT CL PREDICTED SERPL C-G-VRATE: 126 ML/MIN (ref 70–130)
EOSINOPHIL # BLD AUTO: 0 THOU/UL (ref 0–0.7)
EOSINOPHIL NFR BLD AUTO: 0.1 % (ref 0–10)
GLUCOSE SERPL-MCNC: 127 MG/DL (ref 70–105)
HGB BLD-MCNC: 12.6 G/DL (ref 14–18)
LYMPHOCYTES # BLD: 1.1 THOU/UL (ref 1.2–3.4)
LYMPHOCYTES NFR BLD AUTO: 6.4 % (ref 21–51)
MCH RBC QN AUTO: 26.6 PG (ref 27–31)
MCV RBC AUTO: 86.1 FL (ref 78–98)
MONOCYTES # BLD AUTO: 1.7 THOU/UL (ref 0.11–0.59)
MONOCYTES NFR BLD AUTO: 10 % (ref 0–10)
NEUTROPHILS # BLD AUTO: 13.9 THOU/UL (ref 1.4–6.5)
NEUTROPHILS NFR BLD AUTO: 83.4 % (ref 42–75)
PLATELET # BLD AUTO: 196 THOU/UL (ref 130–400)
POTASSIUM SERPL-SCNC: 3.2 MMOL/L (ref 3.5–5.1)
RBC # BLD AUTO: 4.74 MILL/UL (ref 4.7–6.1)
SODIUM SERPL-SCNC: 139 MMOL/L (ref 136–145)
WBC # BLD AUTO: 16.6 THOU/UL (ref 4.8–10.8)

## 2022-09-16 RX ADMIN — Medication SCH ML: at 08:41

## 2022-09-16 RX ADMIN — Medication PRN ML: at 14:00

## 2022-09-16 RX ADMIN — Medication SCH ML: at 21:31

## 2022-09-17 LAB
ANION GAP SERPL CALC-SCNC: 13 MMOL/L (ref 10–20)
BASOPHILS # BLD AUTO: 0 THOU/UL (ref 0–0.2)
BASOPHILS NFR BLD AUTO: 0.4 % (ref 0–1)
BUN SERPL-MCNC: 17 MG/DL (ref 8.4–25.7)
CALCIUM SERPL-MCNC: 8.8 MG/DL (ref 7.8–10.44)
CHLORIDE SERPL-SCNC: 99 MMOL/L (ref 98–107)
CO2 SERPL-SCNC: 30 MMOL/L (ref 22–29)
CREAT CL PREDICTED SERPL C-G-VRATE: 132 ML/MIN (ref 70–130)
EOSINOPHIL # BLD AUTO: 0.2 THOU/UL (ref 0–0.7)
EOSINOPHIL NFR BLD AUTO: 1.2 % (ref 0–10)
GLUCOSE SERPL-MCNC: 95 MG/DL (ref 70–105)
HGB BLD-MCNC: 12.4 G/DL (ref 14–18)
LYMPHOCYTES # BLD: 2.3 THOU/UL (ref 1.2–3.4)
LYMPHOCYTES NFR BLD AUTO: 18 % (ref 21–51)
MCH RBC QN AUTO: 26.5 PG (ref 27–31)
MCV RBC AUTO: 86.3 FL (ref 78–98)
MONOCYTES # BLD AUTO: 1.2 THOU/UL (ref 0.11–0.59)
MONOCYTES NFR BLD AUTO: 9.2 % (ref 0–10)
NEUTROPHILS # BLD AUTO: 9.2 THOU/UL (ref 1.4–6.5)
NEUTROPHILS NFR BLD AUTO: 71.3 % (ref 42–75)
PLATELET # BLD AUTO: 215 THOU/UL (ref 130–400)
POTASSIUM SERPL-SCNC: 3.2 MMOL/L (ref 3.5–5.1)
RBC # BLD AUTO: 4.68 MILL/UL (ref 4.7–6.1)
SODIUM SERPL-SCNC: 139 MMOL/L (ref 136–145)
WBC # BLD AUTO: 12.8 THOU/UL (ref 4.8–10.8)

## 2022-09-17 RX ADMIN — Medication SCH ML: at 09:42

## 2022-09-17 RX ADMIN — Medication SCH ML: at 20:15

## 2022-09-18 LAB — POTASSIUM SERPL-SCNC: 3.4 MMOL/L (ref 3.5–5.1)

## 2022-09-18 RX ADMIN — Medication SCH ML: at 08:17

## 2022-09-18 RX ADMIN — Medication SCH ML: at 21:01

## 2022-09-19 LAB
ALBUMIN SERPL BCG-MCNC: 3.5 G/DL (ref 3.5–5)
ALP SERPL-CCNC: 128 U/L (ref 40–110)
ALT SERPL W P-5'-P-CCNC: 36 U/L (ref 8–55)
ANION GAP SERPL CALC-SCNC: 12 MMOL/L (ref 10–20)
ANION GAP SERPL CALC-SCNC: 14 MMOL/L (ref 10–20)
AST SERPL-CCNC: 16 U/L (ref 5–34)
BILIRUB SERPL-MCNC: 0.3 MG/DL (ref 0.2–1.2)
BUN SERPL-MCNC: 19 MG/DL (ref 8.4–25.7)
BUN SERPL-MCNC: 19 MG/DL (ref 8.4–25.7)
CALCIUM SERPL-MCNC: 8.8 MG/DL (ref 7.8–10.44)
CALCIUM SERPL-MCNC: 9.1 MG/DL (ref 7.8–10.44)
CHLORIDE SERPL-SCNC: 97 MMOL/L (ref 98–107)
CHLORIDE SERPL-SCNC: 99 MMOL/L (ref 98–107)
CO2 SERPL-SCNC: 32 MMOL/L (ref 22–29)
CO2 SERPL-SCNC: 32 MMOL/L (ref 22–29)
CREAT CL PREDICTED SERPL C-G-VRATE: 117 ML/MIN (ref 70–130)
CREAT CL PREDICTED SERPL C-G-VRATE: 91 ML/MIN (ref 70–130)
GLOBULIN SER CALC-MCNC: 3.2 G/DL (ref 2.4–3.5)
GLUCOSE SERPL-MCNC: 112 MG/DL (ref 70–105)
GLUCOSE SERPL-MCNC: 99 MG/DL (ref 70–105)
MAGNESIUM SERPL-MCNC: 2.1 MG/DL (ref 1.6–2.6)
POTASSIUM SERPL-SCNC: 3.4 MMOL/L (ref 3.5–5.1)
POTASSIUM SERPL-SCNC: 3.6 MMOL/L (ref 3.5–5.1)
POTASSIUM SERPL-SCNC: 4.8 MMOL/L (ref 3.5–5.1)
SODIUM SERPL-SCNC: 138 MMOL/L (ref 136–145)
SODIUM SERPL-SCNC: 141 MMOL/L (ref 136–145)

## 2022-09-19 RX ADMIN — MOMETASONE FUROATE AND FORMOTEROL FUMARATE DIHYDRATE SCH MCG: 100; 5 AEROSOL RESPIRATORY (INHALATION) at 18:52

## 2022-09-19 RX ADMIN — Medication PRN ML: at 06:10

## 2022-09-19 RX ADMIN — Medication SCH ML: at 20:11

## 2022-09-19 RX ADMIN — Medication SCH ML: at 09:02

## 2022-09-20 LAB
ALBUMIN SERPL BCG-MCNC: 3.3 G/DL (ref 3.5–5)
ALP SERPL-CCNC: 109 U/L (ref 40–110)
ALT SERPL W P-5'-P-CCNC: 29 U/L (ref 8–55)
ANION GAP SERPL CALC-SCNC: 11 MMOL/L (ref 10–20)
AST SERPL-CCNC: 13 U/L (ref 5–34)
BASOPHILS # BLD AUTO: 0.1 THOU/UL (ref 0–0.2)
BASOPHILS NFR BLD AUTO: 1.2 % (ref 0–1)
BILIRUB SERPL-MCNC: 0.3 MG/DL (ref 0.2–1.2)
BUN SERPL-MCNC: 21 MG/DL (ref 8.4–25.7)
CALCIUM SERPL-MCNC: 8.7 MG/DL (ref 7.8–10.44)
CHLORIDE SERPL-SCNC: 98 MMOL/L (ref 98–107)
CO2 SERPL-SCNC: 31 MMOL/L (ref 22–29)
CREAT CL PREDICTED SERPL C-G-VRATE: 105 ML/MIN (ref 70–130)
EOSINOPHIL # BLD AUTO: 0.1 THOU/UL (ref 0–0.7)
EOSINOPHIL NFR BLD AUTO: 1.1 % (ref 0–10)
GLOBULIN SER CALC-MCNC: 3 G/DL (ref 2.4–3.5)
GLUCOSE SERPL-MCNC: 108 MG/DL (ref 70–105)
HGB BLD-MCNC: 13.5 G/DL (ref 14–18)
LYMPHOCYTES # BLD: 2.4 THOU/UL (ref 1.2–3.4)
LYMPHOCYTES NFR BLD AUTO: 23.5 % (ref 21–51)
MCH RBC QN AUTO: 26.5 PG (ref 27–31)
MCV RBC AUTO: 85.3 FL (ref 78–98)
MONOCYTES # BLD AUTO: 1.1 THOU/UL (ref 0.11–0.59)
MONOCYTES NFR BLD AUTO: 10.4 % (ref 0–10)
NEUTROPHILS # BLD AUTO: 6.5 THOU/UL (ref 1.4–6.5)
NEUTROPHILS NFR BLD AUTO: 63.7 % (ref 42–75)
PLATELET # BLD AUTO: 232 THOU/UL (ref 130–400)
POTASSIUM SERPL-SCNC: 3.3 MMOL/L (ref 3.5–5.1)
POTASSIUM SERPL-SCNC: 3.6 MMOL/L (ref 3.5–5.1)
RBC # BLD AUTO: 5.08 MILL/UL (ref 4.7–6.1)
SODIUM SERPL-SCNC: 137 MMOL/L (ref 136–145)
WBC # BLD AUTO: 10.2 THOU/UL (ref 4.8–10.8)

## 2022-09-20 RX ADMIN — Medication SCH ML: at 09:30

## 2022-09-20 RX ADMIN — Medication SCH ML: at 20:06

## 2022-09-20 RX ADMIN — Medication PRN ML: at 14:01

## 2022-09-20 RX ADMIN — MOMETASONE FUROATE AND FORMOTEROL FUMARATE DIHYDRATE SCH PUFF: 100; 5 AEROSOL RESPIRATORY (INHALATION) at 06:54

## 2022-09-20 RX ADMIN — ASPIRIN SCH MG: 81 TABLET ORAL at 09:29

## 2022-09-20 RX ADMIN — Medication PRN ML: at 06:12

## 2022-09-20 RX ADMIN — MOMETASONE FUROATE AND FORMOTEROL FUMARATE DIHYDRATE SCH PUFF: 100; 5 AEROSOL RESPIRATORY (INHALATION) at 18:58

## 2022-09-21 VITALS — TEMPERATURE: 98 F | SYSTOLIC BLOOD PRESSURE: 158 MMHG | DIASTOLIC BLOOD PRESSURE: 110 MMHG

## 2022-09-21 LAB
ANION GAP SERPL CALC-SCNC: 13 MMOL/L (ref 10–20)
BUN SERPL-MCNC: 21 MG/DL (ref 8.4–25.7)
CALCIUM SERPL-MCNC: 9.1 MG/DL (ref 7.8–10.44)
CHLORIDE SERPL-SCNC: 99 MMOL/L (ref 98–107)
CO2 SERPL-SCNC: 29 MMOL/L (ref 22–29)
CREAT CL PREDICTED SERPL C-G-VRATE: 90 ML/MIN (ref 70–130)
GLUCOSE SERPL-MCNC: 137 MG/DL (ref 70–105)
MAGNESIUM SERPL-MCNC: 2.2 MG/DL (ref 1.6–2.6)
POTASSIUM SERPL-SCNC: 3.3 MMOL/L (ref 3.5–5.1)
POTASSIUM SERPL-SCNC: 3.3 MMOL/L (ref 3.5–5.1)
POTASSIUM SERPL-SCNC: 3.8 MMOL/L (ref 3.5–5.1)
SODIUM SERPL-SCNC: 138 MMOL/L (ref 136–145)

## 2022-09-21 RX ADMIN — Medication SCH ML: at 09:28

## 2022-09-21 RX ADMIN — ASPIRIN SCH MG: 81 TABLET ORAL at 09:28

## 2022-09-21 RX ADMIN — MOMETASONE FUROATE AND FORMOTEROL FUMARATE DIHYDRATE SCH PUFF: 100; 5 AEROSOL RESPIRATORY (INHALATION) at 06:48

## 2022-10-05 ENCOUNTER — HOSPITAL ENCOUNTER (OUTPATIENT)
Dept: HOSPITAL 92 - CSHTELE | Age: 57
Setting detail: OBSERVATION
LOS: 1 days | Discharge: HOME | End: 2022-10-06
Attending: INTERNAL MEDICINE | Admitting: INTERNAL MEDICINE
Payer: MEDICARE

## 2022-10-05 ENCOUNTER — HOSPITAL ENCOUNTER (EMERGENCY)
Dept: HOSPITAL 92 - ERS | Age: 57
Discharge: ANOTHER HOSPITAL | End: 2022-10-05
Payer: MEDICARE

## 2022-10-05 VITALS — BODY MASS INDEX: 37.8 KG/M2

## 2022-10-05 DIAGNOSIS — Z79.01: ICD-10-CM

## 2022-10-05 DIAGNOSIS — I21.4: ICD-10-CM

## 2022-10-05 DIAGNOSIS — Z86.16: ICD-10-CM

## 2022-10-05 DIAGNOSIS — Z86.73: ICD-10-CM

## 2022-10-05 DIAGNOSIS — I50.32: ICD-10-CM

## 2022-10-05 DIAGNOSIS — E78.5: ICD-10-CM

## 2022-10-05 DIAGNOSIS — G40.909: ICD-10-CM

## 2022-10-05 DIAGNOSIS — F32.A: ICD-10-CM

## 2022-10-05 DIAGNOSIS — J45.909: ICD-10-CM

## 2022-10-05 DIAGNOSIS — Z79.899: ICD-10-CM

## 2022-10-05 DIAGNOSIS — J96.01: ICD-10-CM

## 2022-10-05 DIAGNOSIS — Z79.82: ICD-10-CM

## 2022-10-05 DIAGNOSIS — I11.0: ICD-10-CM

## 2022-10-05 DIAGNOSIS — I25.10: ICD-10-CM

## 2022-10-05 DIAGNOSIS — Z91.14: ICD-10-CM

## 2022-10-05 DIAGNOSIS — I48.19: Primary | ICD-10-CM

## 2022-10-05 DIAGNOSIS — I50.9: ICD-10-CM

## 2022-10-05 DIAGNOSIS — U07.1: ICD-10-CM

## 2022-10-05 DIAGNOSIS — R74.01: ICD-10-CM

## 2022-10-05 DIAGNOSIS — K21.9: ICD-10-CM

## 2022-10-05 DIAGNOSIS — I48.91: Primary | ICD-10-CM

## 2022-10-05 LAB
ALBUMIN SERPL BCG-MCNC: 3.4 G/DL (ref 3.5–5)
ALP SERPL-CCNC: 116 U/L (ref 40–110)
ALT SERPL W P-5'-P-CCNC: 83 U/L (ref 8–55)
ANION GAP SERPL CALC-SCNC: 14 MMOL/L (ref 10–20)
AST SERPL-CCNC: 83 U/L (ref 5–34)
BASOPHILS # BLD AUTO: 0.1 THOU/UL (ref 0–0.2)
BASOPHILS NFR BLD AUTO: 0.6 % (ref 0–1)
BILIRUB SERPL-MCNC: 0.9 MG/DL (ref 0.2–1.2)
BUN SERPL-MCNC: 18 MG/DL (ref 8.4–25.7)
CALCIUM SERPL-MCNC: 8.9 MG/DL (ref 7.8–10.44)
CHLORIDE SERPL-SCNC: 111 MMOL/L (ref 98–107)
CK MB SERPL-MCNC: 3.1 NG/ML (ref 0–6.6)
CK MB SERPL-MCNC: 3.9 NG/ML (ref 0–6.6)
CK SERPL-CCNC: 133 U/L (ref 30–200)
CO2 SERPL-SCNC: 20 MMOL/L (ref 22–29)
CREAT CL PREDICTED SERPL C-G-VRATE: 0 ML/MIN (ref 70–130)
DIGOXIN SERPL-MCNC: 0.35 NG/ML (ref 0.8–2)
EOSINOPHIL # BLD AUTO: 0.1 THOU/UL (ref 0–0.7)
EOSINOPHIL NFR BLD AUTO: 0.6 % (ref 0–10)
GLOBULIN SER CALC-MCNC: 2.8 G/DL (ref 2.4–3.5)
GLUCOSE SERPL-MCNC: 138 MG/DL (ref 70–105)
HGB BLD-MCNC: 12.5 G/DL (ref 14–18)
LIPASE SERPL-CCNC: 25 U/L (ref 8–78)
LYMPHOCYTES # BLD: 1.6 THOU/UL (ref 1.2–3.4)
LYMPHOCYTES NFR BLD AUTO: 17.9 % (ref 21–51)
MCH RBC QN AUTO: 28.1 PG (ref 27–31)
MCV RBC AUTO: 86.6 FL (ref 78–98)
MONOCYTES # BLD AUTO: 0.7 THOU/UL (ref 0.11–0.59)
MONOCYTES NFR BLD AUTO: 7.8 % (ref 0–10)
NEUTROPHILS # BLD AUTO: 6.6 THOU/UL (ref 1.4–6.5)
NEUTROPHILS NFR BLD AUTO: 73.1 % (ref 42–75)
PLATELET # BLD AUTO: 213 THOU/UL (ref 130–400)
POTASSIUM SERPL-SCNC: 4 MMOL/L (ref 3.5–5.1)
RBC # BLD AUTO: 4.45 MILL/UL (ref 4.7–6.1)
SARS-COV-2 RNA RESP QL NAA+PROBE: DETECTED
SODIUM SERPL-SCNC: 141 MMOL/L (ref 136–145)
WBC # BLD AUTO: 9.1 THOU/UL (ref 4.8–10.8)

## 2022-10-05 PROCEDURE — 85025 COMPLETE CBC W/AUTO DIFF WBC: CPT

## 2022-10-05 PROCEDURE — 96374 THER/PROPH/DIAG INJ IV PUSH: CPT

## 2022-10-05 PROCEDURE — 83880 ASSAY OF NATRIURETIC PEPTIDE: CPT

## 2022-10-05 PROCEDURE — 86140 C-REACTIVE PROTEIN: CPT

## 2022-10-05 PROCEDURE — 80162 ASSAY OF DIGOXIN TOTAL: CPT

## 2022-10-05 PROCEDURE — 80053 COMPREHEN METABOLIC PANEL: CPT

## 2022-10-05 PROCEDURE — 96375 TX/PRO/DX INJ NEW DRUG ADDON: CPT

## 2022-10-05 PROCEDURE — 84484 ASSAY OF TROPONIN QUANT: CPT

## 2022-10-05 PROCEDURE — 94640 AIRWAY INHALATION TREATMENT: CPT

## 2022-10-05 PROCEDURE — 82553 CREATINE MB FRACTION: CPT

## 2022-10-05 PROCEDURE — 93005 ELECTROCARDIOGRAM TRACING: CPT

## 2022-10-05 PROCEDURE — 96376 TX/PRO/DX INJ SAME DRUG ADON: CPT

## 2022-10-05 PROCEDURE — 99285 EMERGENCY DEPT VISIT HI MDM: CPT

## 2022-10-05 PROCEDURE — 83735 ASSAY OF MAGNESIUM: CPT

## 2022-10-05 PROCEDURE — 36415 COLL VENOUS BLD VENIPUNCTURE: CPT

## 2022-10-05 PROCEDURE — 71045 X-RAY EXAM CHEST 1 VIEW: CPT

## 2022-10-05 PROCEDURE — G0378 HOSPITAL OBSERVATION PER HR: HCPCS

## 2022-10-05 PROCEDURE — U0002 COVID-19 LAB TEST NON-CDC: HCPCS

## 2022-10-05 PROCEDURE — 82550 ASSAY OF CK (CPK): CPT

## 2022-10-05 PROCEDURE — 94760 N-INVAS EAR/PLS OXIMETRY 1: CPT

## 2022-10-05 PROCEDURE — 84443 ASSAY THYROID STIM HORMONE: CPT

## 2022-10-05 PROCEDURE — 83690 ASSAY OF LIPASE: CPT

## 2022-10-05 RX ADMIN — NIRMATRELVIR AND RITONAVIR SCH TAB: KIT at 23:31

## 2022-10-05 RX ADMIN — MOMETASONE FUROATE AND FORMOTEROL FUMARATE DIHYDRATE SCH PUFF: 100; 5 AEROSOL RESPIRATORY (INHALATION) at 19:30

## 2022-10-06 VITALS — SYSTOLIC BLOOD PRESSURE: 159 MMHG | TEMPERATURE: 98.1 F | DIASTOLIC BLOOD PRESSURE: 74 MMHG

## 2022-10-06 LAB
BASOPHILS # BLD AUTO: 0 10X3/UL (ref 0–0.2)
BASOPHILS NFR BLD AUTO: 0.5 % (ref 0–2)
EOSINOPHIL # BLD AUTO: 0.1 10X3/UL (ref 0–0.5)
EOSINOPHIL NFR BLD AUTO: 1.4 % (ref 0–6)
HGB BLD-MCNC: 11.8 G/DL (ref 13.5–17.5)
LYMPHOCYTES NFR BLD AUTO: 14.9 % (ref 18–47)
MAGNESIUM SERPL-MCNC: 1.9 MG/DL (ref 1.6–2.6)
MCH RBC QN AUTO: 26.5 PG (ref 27–33)
MCV RBC AUTO: 82.5 FL (ref 81.2–95.1)
MONOCYTES # BLD AUTO: 0.9 10X3/UL (ref 0–1.1)
MONOCYTES NFR BLD AUTO: 9.9 % (ref 0–10)
NEUTROPHILS # BLD AUTO: 6.4 10X3/UL (ref 1.5–8.4)
NEUTROPHILS NFR BLD AUTO: 73.1 % (ref 40–75)
PLATELET # BLD AUTO: 244 10X3/UL (ref 150–450)
RBC # BLD AUTO: 4.45 10X6/UL (ref 4.32–5.72)
WBC # BLD AUTO: 8.7 10X3/UL (ref 3.5–10.5)

## 2022-10-06 RX ADMIN — NIRMATRELVIR AND RITONAVIR SCH TAB: KIT at 08:29

## 2022-10-06 RX ADMIN — MOMETASONE FUROATE AND FORMOTEROL FUMARATE DIHYDRATE SCH PUFF: 100; 5 AEROSOL RESPIRATORY (INHALATION) at 09:00

## 2022-10-12 ENCOUNTER — HOSPITAL ENCOUNTER (INPATIENT)
Dept: HOSPITAL 92 - ERS | Age: 57
LOS: 3 days | Discharge: HOME | DRG: 291 | End: 2022-10-15
Attending: INTERNAL MEDICINE | Admitting: INTERNAL MEDICINE
Payer: MEDICARE

## 2022-10-12 VITALS — BODY MASS INDEX: 36.8 KG/M2

## 2022-10-12 DIAGNOSIS — T46.4X6A: ICD-10-CM

## 2022-10-12 DIAGNOSIS — T45.516A: ICD-10-CM

## 2022-10-12 DIAGNOSIS — J96.01: ICD-10-CM

## 2022-10-12 DIAGNOSIS — R77.8: ICD-10-CM

## 2022-10-12 DIAGNOSIS — Z79.82: ICD-10-CM

## 2022-10-12 DIAGNOSIS — E78.5: ICD-10-CM

## 2022-10-12 DIAGNOSIS — Z91.14: ICD-10-CM

## 2022-10-12 DIAGNOSIS — I25.2: ICD-10-CM

## 2022-10-12 DIAGNOSIS — U07.1: ICD-10-CM

## 2022-10-12 DIAGNOSIS — I11.0: Primary | ICD-10-CM

## 2022-10-12 DIAGNOSIS — T44.7X6A: ICD-10-CM

## 2022-10-12 DIAGNOSIS — Z87.891: ICD-10-CM

## 2022-10-12 DIAGNOSIS — Z99.81: ICD-10-CM

## 2022-10-12 DIAGNOSIS — T50.1X6A: ICD-10-CM

## 2022-10-12 DIAGNOSIS — I25.10: ICD-10-CM

## 2022-10-12 DIAGNOSIS — E66.9: ICD-10-CM

## 2022-10-12 DIAGNOSIS — Z86.73: ICD-10-CM

## 2022-10-12 DIAGNOSIS — I48.20: ICD-10-CM

## 2022-10-12 DIAGNOSIS — I50.33: ICD-10-CM

## 2022-10-12 DIAGNOSIS — Z82.49: ICD-10-CM

## 2022-10-12 DIAGNOSIS — Z95.5: ICD-10-CM

## 2022-10-12 DIAGNOSIS — Z79.899: ICD-10-CM

## 2022-10-12 DIAGNOSIS — G40.909: ICD-10-CM

## 2022-10-12 LAB
ALBUMIN SERPL BCG-MCNC: 3.5 G/DL (ref 3.5–5)
ALP SERPL-CCNC: 113 U/L (ref 40–110)
ALT SERPL W P-5'-P-CCNC: 83 U/L (ref 8–55)
ANION GAP SERPL CALC-SCNC: 13 MMOL/L (ref 10–20)
AST SERPL-CCNC: 91 U/L (ref 5–34)
BASOPHILS # BLD AUTO: 0 THOU/UL (ref 0–0.2)
BASOPHILS NFR BLD AUTO: 0.1 % (ref 0–1)
BILIRUB SERPL-MCNC: 0.9 MG/DL (ref 0.2–1.2)
BUN SERPL-MCNC: 20 MG/DL (ref 8.4–25.7)
CALCIUM SERPL-MCNC: 8.3 MG/DL (ref 7.8–10.44)
CHLORIDE SERPL-SCNC: 110 MMOL/L (ref 98–107)
CK MB SERPL-MCNC: 3.2 NG/ML (ref 0–6.6)
CO2 SERPL-SCNC: 22 MMOL/L (ref 22–29)
CREAT CL PREDICTED SERPL C-G-VRATE: 0 ML/MIN (ref 70–130)
DIGOXIN SERPL-MCNC: (no result) NG/ML (ref 0.8–2)
EOSINOPHIL # BLD AUTO: 0 THOU/UL (ref 0–0.7)
EOSINOPHIL NFR BLD AUTO: 0.3 % (ref 0–10)
GLOBULIN SER CALC-MCNC: 2.6 G/DL (ref 2.4–3.5)
GLUCOSE SERPL-MCNC: 116 MG/DL (ref 70–105)
HGB BLD-MCNC: 12.7 G/DL (ref 14–18)
LYMPHOCYTES # BLD: 1.1 THOU/UL (ref 1.2–3.4)
LYMPHOCYTES NFR BLD AUTO: 11.2 % (ref 21–51)
MAGNESIUM SERPL-MCNC: 2.2 MG/DL (ref 1.6–2.6)
MCH RBC QN AUTO: 27 PG (ref 27–31)
MCV RBC AUTO: 85.5 FL (ref 78–98)
MONOCYTES # BLD AUTO: 0.6 THOU/UL (ref 0.11–0.59)
MONOCYTES NFR BLD AUTO: 5.8 % (ref 0–10)
NEUTROPHILS # BLD AUTO: 8 THOU/UL (ref 1.4–6.5)
NEUTROPHILS NFR BLD AUTO: 82.6 % (ref 42–75)
PLATELET # BLD AUTO: 258 THOU/UL (ref 130–400)
POTASSIUM SERPL-SCNC: 4.3 MMOL/L (ref 3.5–5.1)
RBC # BLD AUTO: 4.71 MILL/UL (ref 4.7–6.1)
SODIUM SERPL-SCNC: 141 MMOL/L (ref 136–145)
TROPONIN I SERPL DL<=0.01 NG/ML-MCNC: 0.02 NG/ML (ref ?–0.03)
TROPONIN I SERPL DL<=0.01 NG/ML-MCNC: 0.04 NG/ML (ref ?–0.03)
WBC # BLD AUTO: 9.7 THOU/UL (ref 4.8–10.8)

## 2022-10-12 PROCEDURE — 80162 ASSAY OF DIGOXIN TOTAL: CPT

## 2022-10-12 PROCEDURE — 96374 THER/PROPH/DIAG INJ IV PUSH: CPT

## 2022-10-12 PROCEDURE — 96376 TX/PRO/DX INJ SAME DRUG ADON: CPT

## 2022-10-12 PROCEDURE — 80048 BASIC METABOLIC PNL TOTAL CA: CPT

## 2022-10-12 PROCEDURE — 71045 X-RAY EXAM CHEST 1 VIEW: CPT

## 2022-10-12 PROCEDURE — 83735 ASSAY OF MAGNESIUM: CPT

## 2022-10-12 PROCEDURE — 85025 COMPLETE CBC W/AUTO DIFF WBC: CPT

## 2022-10-12 PROCEDURE — 36415 COLL VENOUS BLD VENIPUNCTURE: CPT

## 2022-10-12 PROCEDURE — G0378 HOSPITAL OBSERVATION PER HR: HCPCS

## 2022-10-12 PROCEDURE — 82553 CREATINE MB FRACTION: CPT

## 2022-10-12 PROCEDURE — 83880 ASSAY OF NATRIURETIC PEPTIDE: CPT

## 2022-10-12 PROCEDURE — 84484 ASSAY OF TROPONIN QUANT: CPT

## 2022-10-12 PROCEDURE — 93005 ELECTROCARDIOGRAM TRACING: CPT

## 2022-10-12 PROCEDURE — 96375 TX/PRO/DX INJ NEW DRUG ADDON: CPT

## 2022-10-12 PROCEDURE — 80053 COMPREHEN METABOLIC PANEL: CPT

## 2022-10-12 RX ADMIN — MOMETASONE FUROATE AND FORMOTEROL FUMARATE DIHYDRATE SCH PUFF: 100; 5 AEROSOL RESPIRATORY (INHALATION) at 19:49

## 2022-10-13 LAB
ANION GAP SERPL CALC-SCNC: 12 MMOL/L (ref 10–20)
BASOPHILS # BLD AUTO: 0 THOU/UL (ref 0–0.2)
BASOPHILS NFR BLD AUTO: 0.1 % (ref 0–1)
BUN SERPL-MCNC: 25 MG/DL (ref 8.4–25.7)
CALCIUM SERPL-MCNC: 8.8 MG/DL (ref 7.8–10.44)
CHLORIDE SERPL-SCNC: 104 MMOL/L (ref 98–107)
CO2 SERPL-SCNC: 27 MMOL/L (ref 22–29)
CREAT CL PREDICTED SERPL C-G-VRATE: 117 ML/MIN (ref 70–130)
EOSINOPHIL # BLD AUTO: 0 THOU/UL (ref 0–0.7)
EOSINOPHIL NFR BLD AUTO: 0.1 % (ref 0–10)
GLUCOSE SERPL-MCNC: 133 MG/DL (ref 70–105)
HGB BLD-MCNC: 12.4 G/DL (ref 14–18)
LYMPHOCYTES # BLD: 0.6 THOU/UL (ref 1.2–3.4)
LYMPHOCYTES NFR BLD AUTO: 7.7 % (ref 21–51)
MCH RBC QN AUTO: 26.3 PG (ref 27–31)
MCV RBC AUTO: 86.1 FL (ref 78–98)
MONOCYTES # BLD AUTO: 0.6 THOU/UL (ref 0.11–0.59)
MONOCYTES NFR BLD AUTO: 7 % (ref 0–10)
NEUTROPHILS # BLD AUTO: 7.1 THOU/UL (ref 1.4–6.5)
NEUTROPHILS NFR BLD AUTO: 85.1 % (ref 42–75)
PLATELET # BLD AUTO: 253 THOU/UL (ref 130–400)
POTASSIUM SERPL-SCNC: 4.2 MMOL/L (ref 3.5–5.1)
RBC # BLD AUTO: 4.73 MILL/UL (ref 4.7–6.1)
SODIUM SERPL-SCNC: 139 MMOL/L (ref 136–145)
WBC # BLD AUTO: 8.4 THOU/UL (ref 4.8–10.8)

## 2022-10-13 RX ADMIN — ASPIRIN SCH MG: 81 TABLET ORAL at 10:04

## 2022-10-13 RX ADMIN — MOMETASONE FUROATE AND FORMOTEROL FUMARATE DIHYDRATE SCH: 100; 5 AEROSOL RESPIRATORY (INHALATION) at 10:59

## 2022-10-13 RX ADMIN — MOMETASONE FUROATE AND FORMOTEROL FUMARATE DIHYDRATE SCH PUFF: 100; 5 AEROSOL RESPIRATORY (INHALATION) at 18:09

## 2022-10-14 LAB
ANION GAP SERPL CALC-SCNC: 12 MMOL/L (ref 10–20)
BASOPHILS # BLD AUTO: 0 THOU/UL (ref 0–0.2)
BASOPHILS NFR BLD AUTO: 0.1 % (ref 0–1)
BUN SERPL-MCNC: 30 MG/DL (ref 8.4–25.7)
CALCIUM SERPL-MCNC: 8.4 MG/DL (ref 7.8–10.44)
CHLORIDE SERPL-SCNC: 105 MMOL/L (ref 98–107)
CO2 SERPL-SCNC: 25 MMOL/L (ref 22–29)
CREAT CL PREDICTED SERPL C-G-VRATE: 111 ML/MIN (ref 70–130)
EOSINOPHIL # BLD AUTO: 0.1 THOU/UL (ref 0–0.7)
EOSINOPHIL NFR BLD AUTO: 0.3 % (ref 0–10)
GLUCOSE SERPL-MCNC: 101 MG/DL (ref 70–105)
HGB BLD-MCNC: 12.3 G/DL (ref 14–18)
LYMPHOCYTES # BLD: 2.7 THOU/UL (ref 1.2–3.4)
LYMPHOCYTES NFR BLD AUTO: 15.8 % (ref 21–51)
MCH RBC QN AUTO: 26.6 PG (ref 27–31)
MCV RBC AUTO: 85.9 FL (ref 78–98)
MONOCYTES # BLD AUTO: 1.6 THOU/UL (ref 0.11–0.59)
MONOCYTES NFR BLD AUTO: 9.3 % (ref 0–10)
NEUTROPHILS # BLD AUTO: 12.6 THOU/UL (ref 1.4–6.5)
NEUTROPHILS NFR BLD AUTO: 74.5 % (ref 42–75)
PLATELET # BLD AUTO: 250 THOU/UL (ref 130–400)
POTASSIUM SERPL-SCNC: 3.9 MMOL/L (ref 3.5–5.1)
RBC # BLD AUTO: 4.63 MILL/UL (ref 4.7–6.1)
SODIUM SERPL-SCNC: 138 MMOL/L (ref 136–145)
WBC # BLD AUTO: 17 THOU/UL (ref 4.8–10.8)

## 2022-10-14 RX ADMIN — MOMETASONE FUROATE AND FORMOTEROL FUMARATE DIHYDRATE SCH: 100; 5 AEROSOL RESPIRATORY (INHALATION) at 12:44

## 2022-10-14 RX ADMIN — ASPIRIN SCH MG: 81 TABLET ORAL at 09:16

## 2022-10-14 RX ADMIN — MOMETASONE FUROATE AND FORMOTEROL FUMARATE DIHYDRATE SCH PUFF: 100; 5 AEROSOL RESPIRATORY (INHALATION) at 18:08

## 2022-10-15 VITALS — TEMPERATURE: 97.5 F | SYSTOLIC BLOOD PRESSURE: 135 MMHG | DIASTOLIC BLOOD PRESSURE: 95 MMHG

## 2022-10-15 RX ADMIN — MOMETASONE FUROATE AND FORMOTEROL FUMARATE DIHYDRATE SCH PUFF: 100; 5 AEROSOL RESPIRATORY (INHALATION) at 07:20

## 2022-10-15 RX ADMIN — ASPIRIN SCH MG: 81 TABLET ORAL at 09:03

## 2023-01-16 ENCOUNTER — HOSPITAL ENCOUNTER (INPATIENT)
Dept: HOSPITAL 92 - ERS | Age: 58
LOS: 2 days | Discharge: HOME | DRG: 305 | End: 2023-01-18
Attending: INTERNAL MEDICINE
Payer: MEDICARE

## 2023-01-16 VITALS — BODY MASS INDEX: 35.6 KG/M2

## 2023-01-16 DIAGNOSIS — Z82.49: ICD-10-CM

## 2023-01-16 DIAGNOSIS — J96.11: ICD-10-CM

## 2023-01-16 DIAGNOSIS — I50.32: ICD-10-CM

## 2023-01-16 DIAGNOSIS — Z20.822: ICD-10-CM

## 2023-01-16 DIAGNOSIS — I16.0: Primary | ICD-10-CM

## 2023-01-16 DIAGNOSIS — F14.10: ICD-10-CM

## 2023-01-16 DIAGNOSIS — F12.10: ICD-10-CM

## 2023-01-16 DIAGNOSIS — G40.909: ICD-10-CM

## 2023-01-16 DIAGNOSIS — I25.2: ICD-10-CM

## 2023-01-16 DIAGNOSIS — Z79.82: ICD-10-CM

## 2023-01-16 DIAGNOSIS — I48.20: ICD-10-CM

## 2023-01-16 DIAGNOSIS — F32.A: ICD-10-CM

## 2023-01-16 DIAGNOSIS — E78.5: ICD-10-CM

## 2023-01-16 DIAGNOSIS — I11.0: ICD-10-CM

## 2023-01-16 DIAGNOSIS — K21.9: ICD-10-CM

## 2023-01-16 DIAGNOSIS — Z79.899: ICD-10-CM

## 2023-01-16 DIAGNOSIS — Z91.14: ICD-10-CM

## 2023-01-16 DIAGNOSIS — Z86.73: ICD-10-CM

## 2023-01-16 DIAGNOSIS — Z95.5: ICD-10-CM

## 2023-01-16 DIAGNOSIS — R79.89: ICD-10-CM

## 2023-01-16 LAB
ALBUMIN SERPL BCG-MCNC: 3.9 G/DL (ref 3.5–5)
ALP SERPL-CCNC: 88 U/L (ref 40–110)
ALT SERPL W P-5'-P-CCNC: 17 U/L (ref 8–55)
ANION GAP SERPL CALC-SCNC: 17 MMOL/L (ref 10–20)
AST SERPL-CCNC: 16 U/L (ref 5–34)
BASOPHILS # BLD AUTO: 0 THOU/UL (ref 0–0.2)
BASOPHILS NFR BLD AUTO: 0.1 % (ref 0–1)
BILIRUB SERPL-MCNC: 0.9 MG/DL (ref 0.2–1.2)
BUN SERPL-MCNC: 14 MG/DL (ref 8.4–25.7)
CALCIUM SERPL-MCNC: 8.6 MG/DL (ref 7.8–10.44)
CHLORIDE SERPL-SCNC: 104 MMOL/L (ref 98–107)
CK MB SERPL-MCNC: 5.4 NG/ML (ref 0–6.6)
CK MB SERPL-MCNC: 8.2 NG/ML (ref 0–6.6)
CO2 SERPL-SCNC: 24 MMOL/L (ref 22–29)
CREAT CL PREDICTED SERPL C-G-VRATE: 0 ML/MIN (ref 70–130)
DRUG SCREEN CUTOFF: (no result)
EOSINOPHIL # BLD AUTO: 0 THOU/UL (ref 0–0.7)
EOSINOPHIL NFR BLD AUTO: 0.3 % (ref 0–10)
GLOBULIN SER CALC-MCNC: 2.7 G/DL (ref 2.4–3.5)
GLUCOSE SERPL-MCNC: 142 MG/DL (ref 70–105)
HGB BLD-MCNC: 13 G/DL (ref 14–18)
LYMPHOCYTES # BLD: 0.4 THOU/UL (ref 1.2–3.4)
LYMPHOCYTES NFR BLD AUTO: 4 % (ref 21–51)
MAGNESIUM SERPL-MCNC: 2.1 MG/DL (ref 1.6–2.6)
MCH RBC QN AUTO: 25.5 PG (ref 27–31)
MCV RBC AUTO: 83.9 FL (ref 78–98)
MONOCYTES # BLD AUTO: 0.3 THOU/UL (ref 0.11–0.59)
MONOCYTES NFR BLD AUTO: 3 % (ref 0–10)
NEUTROPHILS # BLD AUTO: 8.1 THOU/UL (ref 1.4–6.5)
NEUTROPHILS NFR BLD AUTO: 92.6 % (ref 42–75)
PLATELET # BLD AUTO: 217 10X3/UL (ref 130–400)
POTASSIUM SERPL-SCNC: 3.6 MMOL/L (ref 3.5–5.1)
RBC # BLD AUTO: 5.12 MILL/UL (ref 4.7–6.1)
SODIUM SERPL-SCNC: 141 MMOL/L (ref 136–145)
WBC # BLD AUTO: 8.7 10X3/UL (ref 4.8–10.8)

## 2023-01-16 PROCEDURE — 80306 DRUG TEST PRSMV INSTRMNT: CPT

## 2023-01-16 PROCEDURE — 83735 ASSAY OF MAGNESIUM: CPT

## 2023-01-16 PROCEDURE — 36415 COLL VENOUS BLD VENIPUNCTURE: CPT

## 2023-01-16 PROCEDURE — 80048 BASIC METABOLIC PNL TOTAL CA: CPT

## 2023-01-16 PROCEDURE — 82553 CREATINE MB FRACTION: CPT

## 2023-01-16 PROCEDURE — U0005 INFEC AGEN DETEC AMPLI PROBE: HCPCS

## 2023-01-16 PROCEDURE — U0003 INFECTIOUS AGENT DETECTION BY NUCLEIC ACID (DNA OR RNA); SEVERE ACUTE RESPIRATORY SYNDROME CORONAVIRUS 2 (SARS-COV-2) (CORONAVIRUS DISEASE [COVID-19]), AMPLIFIED PROBE TECHNIQUE, MAKING USE OF HIGH THROUGHPUT TECHNOLOGIES AS DESCRIBED BY CMS-2020-01-R: HCPCS

## 2023-01-16 PROCEDURE — 71045 X-RAY EXAM CHEST 1 VIEW: CPT

## 2023-01-16 PROCEDURE — 93005 ELECTROCARDIOGRAM TRACING: CPT

## 2023-01-16 PROCEDURE — S0028 INJECTION, FAMOTIDINE, 20 MG: HCPCS

## 2023-01-16 PROCEDURE — 84484 ASSAY OF TROPONIN QUANT: CPT

## 2023-01-16 PROCEDURE — 83880 ASSAY OF NATRIURETIC PEPTIDE: CPT

## 2023-01-16 PROCEDURE — 84100 ASSAY OF PHOSPHORUS: CPT

## 2023-01-16 PROCEDURE — 85025 COMPLETE CBC W/AUTO DIFF WBC: CPT

## 2023-01-16 PROCEDURE — 93306 TTE W/DOPPLER COMPLETE: CPT

## 2023-01-16 PROCEDURE — 80053 COMPREHEN METABOLIC PANEL: CPT

## 2023-01-16 RX ADMIN — FAMOTIDINE SCH MG: 10 INJECTION, SOLUTION INTRAVENOUS at 22:13

## 2023-01-17 LAB
ANION GAP SERPL CALC-SCNC: 12 MMOL/L (ref 10–20)
BASOPHILS # BLD AUTO: 0 THOU/UL (ref 0–0.2)
BASOPHILS NFR BLD AUTO: 0 % (ref 0–1)
BUN SERPL-MCNC: 16 MG/DL (ref 8.4–25.7)
CALCIUM SERPL-MCNC: 8.7 MG/DL (ref 7.8–10.44)
CHLORIDE SERPL-SCNC: 102 MMOL/L (ref 98–107)
CO2 SERPL-SCNC: 30 MMOL/L (ref 22–29)
CREAT CL PREDICTED SERPL C-G-VRATE: 113 ML/MIN (ref 70–130)
EOSINOPHIL # BLD AUTO: 0 THOU/UL (ref 0–0.7)
EOSINOPHIL NFR BLD AUTO: 0.2 % (ref 0–10)
GLUCOSE SERPL-MCNC: 99 MG/DL (ref 70–105)
HGB BLD-MCNC: 12.6 G/DL (ref 14–18)
LYMPHOCYTES # BLD: 1.4 THOU/UL (ref 1.2–3.4)
LYMPHOCYTES NFR BLD AUTO: 11.3 % (ref 21–51)
MAGNESIUM SERPL-MCNC: 2.2 MG/DL (ref 1.6–2.6)
MCH RBC QN AUTO: 25.6 PG (ref 27–31)
MCV RBC AUTO: 83.5 FL (ref 78–98)
MONOCYTES # BLD AUTO: 1.6 THOU/UL (ref 0.11–0.59)
MONOCYTES NFR BLD AUTO: 13 % (ref 0–10)
NEUTROPHILS # BLD AUTO: 9.3 THOU/UL (ref 1.4–6.5)
NEUTROPHILS NFR BLD AUTO: 75.4 % (ref 42–75)
PLATELET # BLD AUTO: 224 10X3/UL (ref 130–400)
POTASSIUM SERPL-SCNC: 3.5 MMOL/L (ref 3.5–5.1)
RBC # BLD AUTO: 4.93 MILL/UL (ref 4.7–6.1)
SODIUM SERPL-SCNC: 140 MMOL/L (ref 136–145)
WBC # BLD AUTO: 12.3 10X3/UL (ref 4.8–10.8)

## 2023-01-17 RX ADMIN — Medication PRN ML: at 10:10

## 2023-01-17 RX ADMIN — FAMOTIDINE SCH MG: 10 INJECTION, SOLUTION INTRAVENOUS at 21:40

## 2023-01-17 RX ADMIN — FAMOTIDINE SCH MG: 10 INJECTION, SOLUTION INTRAVENOUS at 10:10

## 2023-01-18 VITALS — SYSTOLIC BLOOD PRESSURE: 141 MMHG | TEMPERATURE: 98.4 F | DIASTOLIC BLOOD PRESSURE: 91 MMHG

## 2023-01-18 LAB
ANION GAP SERPL CALC-SCNC: 9 MMOL/L (ref 10–20)
BASOPHILS # BLD AUTO: 0 THOU/UL (ref 0–0.2)
BASOPHILS NFR BLD AUTO: 0.3 % (ref 0–1)
BUN SERPL-MCNC: 18 MG/DL (ref 8.4–25.7)
CALCIUM SERPL-MCNC: 8.6 MG/DL (ref 7.8–10.44)
CHLORIDE SERPL-SCNC: 103 MMOL/L (ref 98–107)
CO2 SERPL-SCNC: 29 MMOL/L (ref 22–29)
CREAT CL PREDICTED SERPL C-G-VRATE: 97 ML/MIN (ref 70–130)
EOSINOPHIL # BLD AUTO: 0.1 THOU/UL (ref 0–0.7)
EOSINOPHIL NFR BLD AUTO: 0.7 % (ref 0–10)
GLUCOSE SERPL-MCNC: 103 MG/DL (ref 70–105)
HGB BLD-MCNC: 11.7 G/DL (ref 14–18)
LYMPHOCYTES # BLD: 1.6 THOU/UL (ref 1.2–3.4)
LYMPHOCYTES NFR BLD AUTO: 18.2 % (ref 21–51)
MAGNESIUM SERPL-MCNC: 2.3 MG/DL (ref 1.6–2.6)
MCH RBC QN AUTO: 26.3 PG (ref 27–31)
MCV RBC AUTO: 83.4 FL (ref 78–98)
MONOCYTES # BLD AUTO: 0.9 THOU/UL (ref 0.11–0.59)
MONOCYTES NFR BLD AUTO: 10.1 % (ref 0–10)
NEUTROPHILS # BLD AUTO: 6.3 THOU/UL (ref 1.4–6.5)
NEUTROPHILS NFR BLD AUTO: 70.6 % (ref 42–75)
PLATELET # BLD AUTO: 234 10X3/UL (ref 130–400)
POTASSIUM SERPL-SCNC: 4 MMOL/L (ref 3.5–5.1)
RBC # BLD AUTO: 4.46 MILL/UL (ref 4.7–6.1)
SODIUM SERPL-SCNC: 137 MMOL/L (ref 136–145)
WBC # BLD AUTO: 8.9 10X3/UL (ref 4.8–10.8)

## 2023-01-18 RX ADMIN — Medication PRN ML: at 08:44

## 2023-01-18 RX ADMIN — FAMOTIDINE SCH MG: 10 INJECTION, SOLUTION INTRAVENOUS at 08:44

## 2023-06-18 ENCOUNTER — HOSPITAL ENCOUNTER (INPATIENT)
Dept: HOSPITAL 92 - ERS | Age: 58
LOS: 3 days | Discharge: HOME | DRG: 291 | End: 2023-06-21
Attending: INTERNAL MEDICINE | Admitting: FAMILY MEDICINE
Payer: MEDICARE

## 2023-06-18 VITALS — BODY MASS INDEX: 36.8 KG/M2

## 2023-06-18 DIAGNOSIS — J44.9: ICD-10-CM

## 2023-06-18 DIAGNOSIS — I48.91: ICD-10-CM

## 2023-06-18 DIAGNOSIS — I50.23: ICD-10-CM

## 2023-06-18 DIAGNOSIS — E78.5: ICD-10-CM

## 2023-06-18 DIAGNOSIS — G47.33: ICD-10-CM

## 2023-06-18 DIAGNOSIS — Z98.890: ICD-10-CM

## 2023-06-18 DIAGNOSIS — Z79.899: ICD-10-CM

## 2023-06-18 DIAGNOSIS — Z91.148: ICD-10-CM

## 2023-06-18 DIAGNOSIS — I25.2: ICD-10-CM

## 2023-06-18 DIAGNOSIS — K21.9: ICD-10-CM

## 2023-06-18 DIAGNOSIS — Z86.73: ICD-10-CM

## 2023-06-18 DIAGNOSIS — I48.19: ICD-10-CM

## 2023-06-18 DIAGNOSIS — J96.21: ICD-10-CM

## 2023-06-18 DIAGNOSIS — I11.0: Primary | ICD-10-CM

## 2023-06-18 LAB
ALBUMIN SERPL BCG-MCNC: 3.4 G/DL (ref 3.5–5)
ALP SERPL-CCNC: 119 U/L (ref 40–110)
ALT SERPL W P-5'-P-CCNC: 16 U/L (ref 8–55)
ANION GAP SERPL CALC-SCNC: 14 MMOL/L (ref 10–20)
AST SERPL-CCNC: 21 U/L (ref 5–34)
BASOPHILS # BLD AUTO: 0 THOU/UL (ref 0–0.2)
BASOPHILS NFR BLD AUTO: 0.5 % (ref 0–1)
BILIRUB SERPL-MCNC: 0.7 MG/DL (ref 0.2–1.2)
BUN SERPL-MCNC: 17 MG/DL (ref 8.4–25.7)
CALCIUM SERPL-MCNC: 9 MG/DL (ref 7.8–10.44)
CHLORIDE SERPL-SCNC: 105 MMOL/L (ref 98–107)
CK MB SERPL-MCNC: 3.5 NG/ML (ref 0–6.6)
CO2 SERPL-SCNC: 26 MMOL/L (ref 22–29)
CREAT CL PREDICTED SERPL C-G-VRATE: 0 ML/MIN (ref 70–130)
EOSINOPHIL # BLD AUTO: 0 THOU/UL (ref 0–0.7)
EOSINOPHIL NFR BLD AUTO: 0.2 % (ref 0–10)
GLOBULIN SER CALC-MCNC: 2.9 G/DL (ref 2.4–3.5)
GLUCOSE SERPL-MCNC: 99 MG/DL (ref 70–105)
HGB BLD-MCNC: 11 G/DL (ref 14–18)
LYMPHOCYTES NFR BLD AUTO: 14.8 % (ref 21–51)
MCH RBC QN AUTO: 25.2 PG (ref 27–31)
MCV RBC AUTO: 83.5 FL (ref 78–98)
MONOCYTES # BLD AUTO: 0.8 THOU/UL (ref 0.11–0.59)
MONOCYTES NFR BLD AUTO: 9.6 % (ref 0–10)
NEUTROPHILS # BLD AUTO: 6.5 THOU/UL (ref 1.4–6.5)
NEUTROPHILS NFR BLD AUTO: 74.7 % (ref 42–75)
PLATELET # BLD AUTO: 320 10X3/UL (ref 130–400)
POTASSIUM SERPL-SCNC: 3.5 MMOL/L (ref 3.5–5.1)
RBC # BLD AUTO: 4.36 MILL/UL (ref 4.7–6.1)
SODIUM SERPL-SCNC: 141 MMOL/L (ref 136–145)
WBC # BLD AUTO: 8.7 10X3/UL (ref 4.8–10.8)

## 2023-06-18 PROCEDURE — 80048 BASIC METABOLIC PNL TOTAL CA: CPT

## 2023-06-18 PROCEDURE — 93005 ELECTROCARDIOGRAM TRACING: CPT

## 2023-06-18 PROCEDURE — 84484 ASSAY OF TROPONIN QUANT: CPT

## 2023-06-18 PROCEDURE — 96366 THER/PROPH/DIAG IV INF ADDON: CPT

## 2023-06-18 PROCEDURE — 80053 COMPREHEN METABOLIC PANEL: CPT

## 2023-06-18 PROCEDURE — 96376 TX/PRO/DX INJ SAME DRUG ADON: CPT

## 2023-06-18 PROCEDURE — 71045 X-RAY EXAM CHEST 1 VIEW: CPT

## 2023-06-18 PROCEDURE — 96365 THER/PROPH/DIAG IV INF INIT: CPT

## 2023-06-18 PROCEDURE — 83880 ASSAY OF NATRIURETIC PEPTIDE: CPT

## 2023-06-18 PROCEDURE — 36415 COLL VENOUS BLD VENIPUNCTURE: CPT

## 2023-06-18 PROCEDURE — 85027 COMPLETE CBC AUTOMATED: CPT

## 2023-06-18 PROCEDURE — 82805 BLOOD GASES W/O2 SATURATION: CPT

## 2023-06-18 PROCEDURE — 85025 COMPLETE CBC W/AUTO DIFF WBC: CPT

## 2023-06-18 PROCEDURE — 82553 CREATINE MB FRACTION: CPT

## 2023-06-19 LAB
ANION GAP SERPL CALC-SCNC: 11 MMOL/L (ref 10–20)
BASOPHILS # BLD AUTO: 0 THOU/UL (ref 0–0.2)
BASOPHILS NFR BLD AUTO: 0.4 % (ref 0–1)
BUN SERPL-MCNC: 16 MG/DL (ref 8.4–25.7)
CALCIUM SERPL-MCNC: 8.7 MG/DL (ref 7.8–10.44)
CHLORIDE SERPL-SCNC: 105 MMOL/L (ref 98–107)
CO2 SERPL-SCNC: 26 MMOL/L (ref 22–29)
CREAT CL PREDICTED SERPL C-G-VRATE: 121 ML/MIN (ref 70–130)
EOSINOPHIL # BLD AUTO: 0.1 THOU/UL (ref 0–0.7)
EOSINOPHIL NFR BLD AUTO: 0.6 % (ref 0–10)
GLUCOSE SERPL-MCNC: 113 MG/DL (ref 70–105)
HGB BLD-MCNC: 10.9 G/DL (ref 14–18)
LYMPHOCYTES NFR BLD AUTO: 11.7 % (ref 21–51)
MCH RBC QN AUTO: 25.3 PG (ref 27–31)
MCV RBC AUTO: 84.5 FL (ref 78–98)
MONOCYTES # BLD AUTO: 0.8 THOU/UL (ref 0.11–0.59)
MONOCYTES NFR BLD AUTO: 9.4 % (ref 0–10)
NEUTROPHILS # BLD AUTO: 6.6 THOU/UL (ref 1.4–6.5)
NEUTROPHILS NFR BLD AUTO: 77.4 % (ref 42–75)
PLATELET # BLD AUTO: 316 10X3/UL (ref 130–400)
POTASSIUM SERPL-SCNC: 3.5 MMOL/L (ref 3.5–5.1)
RBC # BLD AUTO: 4.31 MILL/UL (ref 4.7–6.1)
SODIUM SERPL-SCNC: 138 MMOL/L (ref 136–145)
WBC # BLD AUTO: 8.5 10X3/UL (ref 4.8–10.8)

## 2023-06-20 VITALS — SYSTOLIC BLOOD PRESSURE: 140 MMHG | DIASTOLIC BLOOD PRESSURE: 100 MMHG

## 2023-06-20 LAB
ANALYZER IN CARDIO: (no result)
ANION GAP SERPL CALC-SCNC: 11 MMOL/L (ref 10–20)
BASE EXCESS STD BLDV CALC-SCNC: 3.7 MEQ/L
BUN SERPL-MCNC: 18 MG/DL (ref 8.4–25.7)
CA-I BLDV-MCNC: 1.06 MMOL/L (ref 1.16–1.32)
CALCIUM SERPL-MCNC: 8.7 MG/DL (ref 7.8–10.44)
CHLORIDE BLDV-SCNC: 102 MMOL/L (ref 98–106)
CHLORIDE SERPL-SCNC: 103 MMOL/L (ref 98–107)
CO2 SERPL-SCNC: 27 MMOL/L (ref 22–29)
CREAT CL PREDICTED SERPL C-G-VRATE: 125 ML/MIN (ref 70–130)
GLUCOSE SERPL-MCNC: 106 MG/DL (ref 70–105)
HCO3 BLDV-SCNC: 29.5 MEQ/L (ref 22–28)
HCT VFR BLDV CALC: 37 % (ref 42–52)
HGB BLD-MCNC: 10.6 G/DL (ref 14–18)
HGB BLDV-MCNC: 12.5 G/DL (ref 13.1–17.2)
MCH RBC QN AUTO: 25.5 PG (ref 27–31)
MCV RBC AUTO: 83.4 FL (ref 78–98)
PLATELET # BLD AUTO: 295 10X3/UL (ref 130–400)
POTASSIUM BLDV-SCNC: 3.74 MMOL/L (ref 3.7–5.3)
POTASSIUM SERPL-SCNC: 3.4 MMOL/L (ref 3.5–5.1)
RBC # BLD AUTO: 4.16 MILL/UL (ref 4.7–6.1)
SODIUM BLDV-SCNC: 137.6 MMOL/L (ref 133–146)
SODIUM SERPL-SCNC: 138 MMOL/L (ref 136–145)
WBC # BLD AUTO: 9.9 10X3/UL (ref 4.8–10.8)

## 2023-06-20 RX ADMIN — Medication SCH ML: at 20:43

## 2023-06-21 VITALS — TEMPERATURE: 97.6 F

## 2023-06-21 RX ADMIN — Medication SCH ML: at 09:36

## 2023-09-17 ENCOUNTER — HOSPITAL ENCOUNTER (EMERGENCY)
Dept: HOSPITAL 92 - ERS | Age: 58
Discharge: HOME | End: 2023-09-17
Payer: MEDICARE

## 2023-09-17 DIAGNOSIS — F12.288: Primary | ICD-10-CM

## 2023-09-17 DIAGNOSIS — K21.9: ICD-10-CM

## 2023-09-17 DIAGNOSIS — J44.9: ICD-10-CM

## 2023-09-17 DIAGNOSIS — I50.9: ICD-10-CM

## 2023-09-17 DIAGNOSIS — Z79.899: ICD-10-CM

## 2023-09-17 DIAGNOSIS — Z86.73: ICD-10-CM

## 2023-09-17 DIAGNOSIS — I11.0: ICD-10-CM

## 2023-09-17 DIAGNOSIS — R11.10: ICD-10-CM

## 2023-09-17 LAB
ALBUMIN SERPL BCG-MCNC: 4 G/DL (ref 3.5–5)
ALP SERPL-CCNC: 86 U/L (ref 40–110)
ALT SERPL W P-5'-P-CCNC: 10 U/L (ref 8–55)
ANION GAP SERPL CALC-SCNC: 15 MMOL/L (ref 10–20)
AST SERPL-CCNC: 16 U/L (ref 5–34)
BASOPHILS # BLD AUTO: 0 THOU/UL (ref 0–0.2)
BASOPHILS NFR BLD AUTO: 0.2 % (ref 0–1)
BILIRUB SERPL-MCNC: 0.6 MG/DL (ref 0.2–1.2)
BUN SERPL-MCNC: 17 MG/DL (ref 8.4–25.7)
CALCIUM SERPL-MCNC: 9.2 MG/DL (ref 7.8–10.44)
CHLORIDE SERPL-SCNC: 102 MMOL/L (ref 98–107)
CO2 SERPL-SCNC: 26 MMOL/L (ref 22–29)
CREAT CL PREDICTED SERPL C-G-VRATE: 0 ML/MIN (ref 70–130)
EOSINOPHIL # BLD AUTO: 0 THOU/UL (ref 0–0.7)
EOSINOPHIL NFR BLD AUTO: 0.2 % (ref 0–10)
GLOBULIN SER CALC-MCNC: 3.8 G/DL (ref 2.4–3.5)
GLUCOSE SERPL-MCNC: 119 MG/DL (ref 70–105)
HCT VFR BLD CALC: 41 % (ref 42–52)
HGB BLD-MCNC: 12.2 G/DL (ref 14–18)
LIPASE SERPL-CCNC: 13 U/L (ref 8–78)
LYMPHOCYTES NFR BLD AUTO: 12 % (ref 21–51)
MCH RBC QN AUTO: 23.5 PG (ref 27–31)
MCV RBC AUTO: 78.8 FL (ref 78–98)
MONOCYTES # BLD AUTO: 0.5 THOU/UL (ref 0.11–0.59)
MONOCYTES NFR BLD AUTO: 5.9 % (ref 0–10)
NEUTROPHILS # BLD AUTO: 7.3 THOU/UL (ref 1.4–6.5)
NEUTROPHILS NFR BLD AUTO: 81.5 % (ref 42–75)
PLATELET # BLD AUTO: 247 10X3/UL (ref 130–400)
POTASSIUM SERPL-SCNC: 3.9 MMOL/L (ref 3.5–5.1)
RBC # BLD AUTO: 5.2 MILL/UL (ref 4.7–6.1)
SODIUM SERPL-SCNC: 139 MMOL/L (ref 136–145)
TROPONIN I SERPL DL<=0.01 NG/ML-MCNC: 0.18 NG/ML (ref ?–0.03)
WBC # BLD AUTO: 8.9 10X3/UL (ref 4.8–10.8)

## 2023-09-17 PROCEDURE — 93005 ELECTROCARDIOGRAM TRACING: CPT

## 2023-09-17 PROCEDURE — 36415 COLL VENOUS BLD VENIPUNCTURE: CPT

## 2023-09-17 PROCEDURE — 84484 ASSAY OF TROPONIN QUANT: CPT

## 2023-09-17 PROCEDURE — 80053 COMPREHEN METABOLIC PANEL: CPT

## 2023-09-17 PROCEDURE — 83690 ASSAY OF LIPASE: CPT

## 2023-09-17 PROCEDURE — 85025 COMPLETE CBC W/AUTO DIFF WBC: CPT
